# Patient Record
Sex: FEMALE | Race: WHITE | NOT HISPANIC OR LATINO | Employment: PART TIME | URBAN - METROPOLITAN AREA
[De-identification: names, ages, dates, MRNs, and addresses within clinical notes are randomized per-mention and may not be internally consistent; named-entity substitution may affect disease eponyms.]

---

## 2017-01-12 LAB
EXTERNAL ABO GROUPING: NORMAL
EXTERNAL RH FACTOR: POSITIVE
EXTERNAL RUBELLA QUALITATIVE: NORMAL
HIV1 AB SPEC QL IA.RAPID: NEGATIVE

## 2017-05-30 ENCOUNTER — TRANSCRIBE ORDERS (OUTPATIENT)
Dept: OBSTETRICS AND GYNECOLOGY | Facility: HOSPITAL | Age: 37
End: 2017-05-30

## 2017-05-30 DIAGNOSIS — O09.522 AMA (ADVANCED MATERNAL AGE) MULTIGRAVIDA 35+, SECOND TRIMESTER: Primary | ICD-10-CM

## 2017-06-20 ENCOUNTER — OFFICE VISIT (OUTPATIENT)
Dept: OBSTETRICS AND GYNECOLOGY | Facility: HOSPITAL | Age: 37
End: 2017-06-20

## 2017-06-20 ENCOUNTER — HOSPITAL ENCOUNTER (OUTPATIENT)
Dept: WOMENS IMAGING | Facility: HOSPITAL | Age: 37
Discharge: HOME | End: 2017-06-20
Attending: OBSTETRICS & GYNECOLOGY | Admitting: OBSTETRICS & GYNECOLOGY

## 2017-06-20 VITALS
HEIGHT: 65 IN | WEIGHT: 155.4 LBS | DIASTOLIC BLOOD PRESSURE: 67 MMHG | SYSTOLIC BLOOD PRESSURE: 113 MMHG | BODY MASS INDEX: 25.89 KG/M2

## 2017-06-20 DIAGNOSIS — O09.523 AMA (ADVANCED MATERNAL AGE) MULTIGRAVIDA 35+, THIRD TRIMESTER: Primary | ICD-10-CM

## 2017-06-20 DIAGNOSIS — O09.522 AMA (ADVANCED MATERNAL AGE) MULTIGRAVIDA 35+, SECOND TRIMESTER: ICD-10-CM

## 2017-06-20 PROCEDURE — 76811 OB US DETAILED SNGL FETUS: CPT

## 2017-06-20 PROCEDURE — 76811 OB US DETAILED SNGL FETUS: CPT | Performed by: OBSTETRICS & GYNECOLOGY

## 2017-06-20 RX ORDER — PRENATAL WITH FERROUS FUM AND FOLIC ACID 3080; 920; 120; 400; 22; 1.84; 3; 20; 10; 1; 12; 200; 27; 25; 2 [IU]/1; [IU]/1; MG/1; [IU]/1; MG/1; MG/1; MG/1; MG/1; MG/1; MG/1; UG/1; MG/1; MG/1; MG/1; MG/1
1 TABLET ORAL DAILY
COMMUNITY
End: 2020-12-17

## 2017-06-20 NOTE — PROGRESS NOTES
Documentation of the ultasound findings, images, and interpretations with be available in the patient's Viewpoint report located in the Chart Review Imaging tab in [place].

## 2017-06-20 NOTE — PROGRESS NOTES
Denies problems. Reports normal Panorama NIPT result. Prenatal care with Sarah Nava until 4 weeks ago; transferred to Dr. Stein because MsFidel Nava moved her practice to [facility].

## 2017-07-19 ENCOUNTER — LAB REQUISITION (OUTPATIENT)
Dept: LAB | Facility: HOSPITAL | Age: 37
End: 2017-07-19

## 2017-07-19 DIAGNOSIS — Z34.83 ENCOUNTER FOR SUPERVISION OF OTHER NORMAL PREGNANCY, THIRD TRIMESTER: ICD-10-CM

## 2017-07-19 LAB — EXTERNAL GROUP B STREP ANTIGEN: NORMAL

## 2017-07-19 PROCEDURE — 87081 CULTURE SCREEN ONLY: CPT | Performed by: OBSTETRICS & GYNECOLOGY

## 2017-07-22 LAB — BACTERIA SPEC AEROBE CULT: NORMAL

## 2017-08-07 ENCOUNTER — HOSPITAL ENCOUNTER (INPATIENT)
Dept: LABOR AND DELIVERY | Facility: HOSPITAL | Age: 37
LOS: 2 days | Discharge: HOME | End: 2017-08-10
Attending: OBSTETRICS & GYNECOLOGY | Admitting: OBSTETRICS & GYNECOLOGY

## 2017-08-07 DIAGNOSIS — Z3A.39 39 WEEKS GESTATION OF PREGNANCY: Primary | ICD-10-CM

## 2017-08-07 LAB
ABO GROUP BLD: NORMAL
BLD GP AB SCN SERPL QL: NEGATIVE
DEPRECATED RDW RBC AUTO: 43.6 FL (ref 37–54)
ERYTHROCYTE [DISTWIDTH] IN BLOOD BY AUTOMATED COUNT: 13.2 % (ref 11.3–14.5)
HCT VFR BLD AUTO: 39.1 % (ref 34.5–44)
HGB BLD-MCNC: 13.5 G/DL (ref 11.5–15.5)
MCH RBC QN AUTO: 31.2 PG (ref 27–31)
MCHC RBC AUTO-ENTMCNC: 34.5 G/DL (ref 32–36)
MCV RBC AUTO: 90.3 FL (ref 80–99)
PLATELET # BLD AUTO: 161 10*3/MM3 (ref 150–450)
PMV BLD AUTO: 11.3 FL (ref 6–12)
RBC # BLD AUTO: 4.33 10*6/MM3 (ref 3.89–5.14)
RH BLD: POSITIVE
WBC NRBC COR # BLD: 11.08 10*3/MM3 (ref 3.5–10.8)

## 2017-08-07 PROCEDURE — 59025 FETAL NON-STRESS TEST: CPT

## 2017-08-07 PROCEDURE — 86850 RBC ANTIBODY SCREEN: CPT | Performed by: OBSTETRICS & GYNECOLOGY

## 2017-08-07 PROCEDURE — 86900 BLOOD TYPING SEROLOGIC ABO: CPT | Performed by: OBSTETRICS & GYNECOLOGY

## 2017-08-07 PROCEDURE — 85027 COMPLETE CBC AUTOMATED: CPT | Performed by: OBSTETRICS & GYNECOLOGY

## 2017-08-07 PROCEDURE — 86901 BLOOD TYPING SEROLOGIC RH(D): CPT | Performed by: OBSTETRICS & GYNECOLOGY

## 2017-08-07 RX ORDER — SODIUM CHLORIDE, SODIUM LACTATE, POTASSIUM CHLORIDE, CALCIUM CHLORIDE 600; 310; 30; 20 MG/100ML; MG/100ML; MG/100ML; MG/100ML
125 INJECTION, SOLUTION INTRAVENOUS CONTINUOUS
Status: DISCONTINUED | OUTPATIENT
Start: 2017-08-07 | End: 2017-08-08

## 2017-08-07 RX ADMIN — SODIUM CHLORIDE, POTASSIUM CHLORIDE, SODIUM LACTATE AND CALCIUM CHLORIDE 125 ML/HR: 600; 310; 30; 20 INJECTION, SOLUTION INTRAVENOUS at 22:03

## 2017-08-08 ENCOUNTER — ANESTHESIA EVENT (OUTPATIENT)
Dept: LABOR AND DELIVERY | Facility: HOSPITAL | Age: 37
End: 2017-08-08

## 2017-08-08 ENCOUNTER — ANESTHESIA (OUTPATIENT)
Dept: LABOR AND DELIVERY | Facility: HOSPITAL | Age: 37
End: 2017-08-08

## 2017-08-08 ENCOUNTER — APPOINTMENT (OUTPATIENT)
Dept: LABOR AND DELIVERY | Facility: HOSPITAL | Age: 37
End: 2017-08-08

## 2017-08-08 PROBLEM — Z34.90 PREGNANCY: Status: ACTIVE | Noted: 2017-08-08

## 2017-08-08 LAB
AMPHET+METHAMPHET UR QL: NEGATIVE
AMPHETAMINES UR QL: NEGATIVE
BARBITURATES UR QL SCN: NEGATIVE
BENZODIAZ UR QL SCN: NEGATIVE
BUPRENORPHINE SERPL-MCNC: NEGATIVE NG/ML
CANNABINOIDS SERPL QL: NEGATIVE
COCAINE UR QL: NEGATIVE
METHADONE UR QL SCN: NEGATIVE
OPIATES UR QL: NEGATIVE
OXYCODONE UR QL SCN: NEGATIVE
PCP UR QL SCN: NEGATIVE
PROPOXYPH UR QL: NEGATIVE
TRICYCLICS UR QL SCN: NEGATIVE

## 2017-08-08 PROCEDURE — 25010000002 FENTANYL CITRATE (PF) 100 MCG/2ML SOLUTION: Performed by: NURSE ANESTHETIST, CERTIFIED REGISTERED

## 2017-08-08 PROCEDURE — 0HQ9XZZ REPAIR PERINEUM SKIN, EXTERNAL APPROACH: ICD-10-PCS | Performed by: OBSTETRICS & GYNECOLOGY

## 2017-08-08 PROCEDURE — C1755 CATHETER, INTRASPINAL: HCPCS | Performed by: ANESTHESIOLOGY

## 2017-08-08 PROCEDURE — C1755 CATHETER, INTRASPINAL: HCPCS

## 2017-08-08 PROCEDURE — 10907ZC DRAINAGE OF AMNIOTIC FLUID, THERAPEUTIC FROM PRODUCTS OF CONCEPTION, VIA NATURAL OR ARTIFICIAL OPENING: ICD-10-PCS | Performed by: OBSTETRICS & GYNECOLOGY

## 2017-08-08 PROCEDURE — 25010000002 ROPIVACAINE PER 1 MG: Performed by: NURSE ANESTHETIST, CERTIFIED REGISTERED

## 2017-08-08 PROCEDURE — 59025 FETAL NON-STRESS TEST: CPT

## 2017-08-08 PROCEDURE — 80306 DRUG TEST PRSMV INSTRMNT: CPT | Performed by: OBSTETRICS & GYNECOLOGY

## 2017-08-08 RX ORDER — PROMETHAZINE HYDROCHLORIDE 25 MG/ML
12.5 INJECTION, SOLUTION INTRAMUSCULAR; INTRAVENOUS EVERY 6 HOURS PRN
Status: DISCONTINUED | OUTPATIENT
Start: 2017-08-08 | End: 2017-08-08 | Stop reason: HOSPADM

## 2017-08-08 RX ORDER — MORPHINE SULFATE 4 MG/ML
2 INJECTION, SOLUTION INTRAMUSCULAR; INTRAVENOUS
Status: DISCONTINUED | OUTPATIENT
Start: 2017-08-08 | End: 2017-08-08 | Stop reason: HOSPADM

## 2017-08-08 RX ORDER — IBUPROFEN 600 MG/1
600 TABLET ORAL EVERY 6 HOURS PRN
Status: DISCONTINUED | OUTPATIENT
Start: 2017-08-08 | End: 2017-08-08 | Stop reason: HOSPADM

## 2017-08-08 RX ORDER — FENTANYL CITRATE 50 UG/ML
INJECTION, SOLUTION INTRAMUSCULAR; INTRAVENOUS AS NEEDED
Status: DISCONTINUED | OUTPATIENT
Start: 2017-08-08 | End: 2017-08-08 | Stop reason: SURG

## 2017-08-08 RX ORDER — PROMETHAZINE HYDROCHLORIDE 12.5 MG/1
12.5 TABLET ORAL EVERY 6 HOURS PRN
Status: DISCONTINUED | OUTPATIENT
Start: 2017-08-08 | End: 2017-08-08 | Stop reason: HOSPADM

## 2017-08-08 RX ORDER — SODIUM CHLORIDE 0.9 % (FLUSH) 0.9 %
1-10 SYRINGE (ML) INJECTION AS NEEDED
Status: DISCONTINUED | OUTPATIENT
Start: 2017-08-08 | End: 2017-08-08 | Stop reason: HOSPADM

## 2017-08-08 RX ORDER — DIPHENHYDRAMINE HCL 25 MG
25 CAPSULE ORAL NIGHTLY PRN
Status: DISCONTINUED | OUTPATIENT
Start: 2017-08-08 | End: 2017-08-08 | Stop reason: HOSPADM

## 2017-08-08 RX ORDER — METOCLOPRAMIDE HYDROCHLORIDE 5 MG/ML
10 INJECTION INTRAMUSCULAR; INTRAVENOUS ONCE AS NEEDED
Status: DISCONTINUED | OUTPATIENT
Start: 2017-08-08 | End: 2017-08-08 | Stop reason: HOSPADM

## 2017-08-08 RX ORDER — IBUPROFEN 600 MG/1
600 TABLET ORAL EVERY 6 HOURS PRN
Status: DISCONTINUED | OUTPATIENT
Start: 2017-08-08 | End: 2017-08-10 | Stop reason: HOSPADM

## 2017-08-08 RX ORDER — TRISODIUM CITRATE DIHYDRATE AND CITRIC ACID MONOHYDRATE 500; 334 MG/5ML; MG/5ML
30 SOLUTION ORAL ONCE
Status: DISCONTINUED | OUTPATIENT
Start: 2017-08-08 | End: 2017-08-08 | Stop reason: HOSPADM

## 2017-08-08 RX ORDER — DIPHENHYDRAMINE HYDROCHLORIDE 50 MG/ML
25 INJECTION INTRAMUSCULAR; INTRAVENOUS NIGHTLY PRN
Status: DISCONTINUED | OUTPATIENT
Start: 2017-08-08 | End: 2017-08-08 | Stop reason: HOSPADM

## 2017-08-08 RX ORDER — ZOLPIDEM TARTRATE 5 MG/1
5 TABLET ORAL NIGHTLY PRN
Status: DISCONTINUED | OUTPATIENT
Start: 2017-08-08 | End: 2017-08-10 | Stop reason: HOSPADM

## 2017-08-08 RX ORDER — OXYCODONE HYDROCHLORIDE AND ACETAMINOPHEN 5; 325 MG/1; MG/1
1 TABLET ORAL EVERY 4 HOURS PRN
Status: DISCONTINUED | OUTPATIENT
Start: 2017-08-08 | End: 2017-08-08 | Stop reason: HOSPADM

## 2017-08-08 RX ORDER — ONDANSETRON 2 MG/ML
4 INJECTION INTRAMUSCULAR; INTRAVENOUS ONCE AS NEEDED
Status: DISCONTINUED | OUTPATIENT
Start: 2017-08-08 | End: 2017-08-08 | Stop reason: HOSPADM

## 2017-08-08 RX ORDER — PROMETHAZINE HYDROCHLORIDE 12.5 MG/1
12.5 SUPPOSITORY RECTAL EVERY 6 HOURS PRN
Status: DISCONTINUED | OUTPATIENT
Start: 2017-08-08 | End: 2017-08-08 | Stop reason: HOSPADM

## 2017-08-08 RX ORDER — OXYTOCIN/RINGER'S LACTATE 30/500 ML
2-24 PLASTIC BAG, INJECTION (ML) INTRAVENOUS
Status: DISCONTINUED | OUTPATIENT
Start: 2017-08-08 | End: 2017-08-08 | Stop reason: HOSPADM

## 2017-08-08 RX ORDER — SODIUM CHLORIDE, SODIUM LACTATE, POTASSIUM CHLORIDE, CALCIUM CHLORIDE 600; 310; 30; 20 MG/100ML; MG/100ML; MG/100ML; MG/100ML
125 INJECTION, SOLUTION INTRAVENOUS CONTINUOUS
Status: DISCONTINUED | OUTPATIENT
Start: 2017-08-08 | End: 2017-08-08

## 2017-08-08 RX ORDER — LIDOCAINE HYDROCHLORIDE 10 MG/ML
5 INJECTION, SOLUTION INFILTRATION; PERINEURAL AS NEEDED
Status: DISCONTINUED | OUTPATIENT
Start: 2017-08-08 | End: 2017-08-08 | Stop reason: HOSPADM

## 2017-08-08 RX ORDER — ACETAMINOPHEN 325 MG/1
650 TABLET ORAL EVERY 4 HOURS PRN
Status: DISCONTINUED | OUTPATIENT
Start: 2017-08-08 | End: 2017-08-08 | Stop reason: HOSPADM

## 2017-08-08 RX ORDER — LANOLIN 100 %
OINTMENT (GRAM) TOPICAL AS NEEDED
Status: DISCONTINUED | OUTPATIENT
Start: 2017-08-08 | End: 2017-08-10 | Stop reason: HOSPADM

## 2017-08-08 RX ORDER — OXYCODONE HYDROCHLORIDE AND ACETAMINOPHEN 5; 325 MG/1; MG/1
2 TABLET ORAL EVERY 4 HOURS PRN
Status: DISCONTINUED | OUTPATIENT
Start: 2017-08-08 | End: 2017-08-08 | Stop reason: HOSPADM

## 2017-08-08 RX ORDER — LIDOCAINE HYDROCHLORIDE AND EPINEPHRINE 15; 5 MG/ML; UG/ML
INJECTION, SOLUTION EPIDURAL AS NEEDED
Status: DISCONTINUED | OUTPATIENT
Start: 2017-08-08 | End: 2017-08-08 | Stop reason: SURG

## 2017-08-08 RX ORDER — ZOLPIDEM TARTRATE 5 MG/1
5 TABLET ORAL NIGHTLY PRN
Status: DISCONTINUED | OUTPATIENT
Start: 2017-08-08 | End: 2017-08-08 | Stop reason: HOSPADM

## 2017-08-08 RX ORDER — HYDROCODONE BITARTRATE AND ACETAMINOPHEN 5; 325 MG/1; MG/1
1 TABLET ORAL EVERY 4 HOURS PRN
Status: DISCONTINUED | OUTPATIENT
Start: 2017-08-08 | End: 2017-08-10 | Stop reason: HOSPADM

## 2017-08-08 RX ORDER — EPHEDRINE SULFATE/0.9% NACL/PF 50 MG/10ML
10 SYRINGE (ML) INTRAVENOUS
Status: DISCONTINUED | OUTPATIENT
Start: 2017-08-08 | End: 2017-08-08 | Stop reason: HOSPADM

## 2017-08-08 RX ORDER — BISACODYL 10 MG
10 SUPPOSITORY, RECTAL RECTAL DAILY PRN
Status: DISCONTINUED | OUTPATIENT
Start: 2017-08-09 | End: 2017-08-10 | Stop reason: HOSPADM

## 2017-08-08 RX ORDER — PROMETHAZINE HYDROCHLORIDE 25 MG/1
25 TABLET ORAL EVERY 6 HOURS PRN
Status: DISCONTINUED | OUTPATIENT
Start: 2017-08-08 | End: 2017-08-10 | Stop reason: HOSPADM

## 2017-08-08 RX ORDER — FAMOTIDINE 10 MG/ML
20 INJECTION, SOLUTION INTRAVENOUS ONCE AS NEEDED
Status: DISCONTINUED | OUTPATIENT
Start: 2017-08-08 | End: 2017-08-08 | Stop reason: HOSPADM

## 2017-08-08 RX ORDER — DOCUSATE SODIUM 100 MG/1
100 CAPSULE, LIQUID FILLED ORAL 2 TIMES DAILY
Status: DISCONTINUED | OUTPATIENT
Start: 2017-08-08 | End: 2017-08-10 | Stop reason: HOSPADM

## 2017-08-08 RX ORDER — DIPHENHYDRAMINE HYDROCHLORIDE 50 MG/ML
12.5 INJECTION INTRAMUSCULAR; INTRAVENOUS EVERY 8 HOURS PRN
Status: DISCONTINUED | OUTPATIENT
Start: 2017-08-08 | End: 2017-08-08 | Stop reason: HOSPADM

## 2017-08-08 RX ADMIN — ROPIVACAINE HYDROCHLORIDE 15 ML/HR: 5 INJECTION, SOLUTION EPIDURAL; INFILTRATION; PERINEURAL at 09:47

## 2017-08-08 RX ADMIN — WITCH HAZEL 1 PAD: 500 SOLUTION RECTAL; TOPICAL at 16:37

## 2017-08-08 RX ADMIN — BENZOCAINE AND MENTHOL: 20; .5 SPRAY TOPICAL at 16:37

## 2017-08-08 RX ADMIN — FENTANYL CITRATE 100 MCG: 50 INJECTION, SOLUTION INTRAMUSCULAR; INTRAVENOUS at 09:42

## 2017-08-08 RX ADMIN — SODIUM CHLORIDE, POTASSIUM CHLORIDE, SODIUM LACTATE AND CALCIUM CHLORIDE 125 ML/HR: 600; 310; 30; 20 INJECTION, SOLUTION INTRAVENOUS at 09:47

## 2017-08-08 RX ADMIN — LIDOCAINE HYDROCHLORIDE AND EPINEPHRINE 3 ML: 15; 5 INJECTION, SOLUTION EPIDURAL at 09:38

## 2017-08-08 RX ADMIN — IBUPROFEN 600 MG: 600 TABLET, FILM COATED ORAL at 16:37

## 2017-08-08 RX ADMIN — IBUPROFEN 600 MG: 600 TABLET, FILM COATED ORAL at 22:26

## 2017-08-08 RX ADMIN — LIDOCAINE HYDROCHLORIDE AND EPINEPHRINE 2 ML: 15; 5 INJECTION, SOLUTION EPIDURAL at 09:41

## 2017-08-08 RX ADMIN — SODIUM CHLORIDE, POTASSIUM CHLORIDE, SODIUM LACTATE AND CALCIUM CHLORIDE 1000 ML: 600; 310; 30; 20 INJECTION, SOLUTION INTRAVENOUS at 09:03

## 2017-08-08 RX ADMIN — Medication 2 MILLI-UNITS/MIN: at 06:14

## 2017-08-08 RX ADMIN — ROPIVACAINE HYDROCHLORIDE 10 ML: 5 INJECTION, SOLUTION EPIDURAL; INFILTRATION; PERINEURAL at 09:45

## 2017-08-08 RX ADMIN — DOCUSATE SODIUM 100 MG: 100 CAPSULE, LIQUID FILLED ORAL at 20:50

## 2017-08-08 RX ADMIN — HYDROCORTISONE 1 APPLICATION: 25 CREAM TOPICAL at 16:37

## 2017-08-08 NOTE — PLAN OF CARE
Problem: Labor (Cervical Ripen, Induct, Augment) (Adult,Obstetrics,Pediatric)  Goal: Signs and Symptoms of Listed Potential Problems Will be Absent or Manageable (Labor)  Outcome: Outcome(s) achieved Date Met:  08/08/17

## 2017-08-08 NOTE — H&P
History and Physical   [facility]    Chief Complaint   Patient presents with   • Scheduled Induction     elective        Patient Active Problem List   Diagnosis   •  (spontaneous vaginal delivery)       Kimberly Booth is a 37 y.o. year old  with an Estimated Date of Delivery: 8/15/17 currently at 39w0d presenting with for induction of labor for elective at term per patient request.  She currently reports no complaints    Prenatal care has been with Dr. Culp.  It has been significant for uncomplicated    Risks of labor induction including prolongation of labor, increased risks for both  section and operative vaginal birth have been discussed at length.       The following portions of the patient's history were reviewed and updated as appropriate:vital signs, allergies, current medications, past medical history, past social history, past surgical history and problem list.    PAST MEDICAL HISTORY  History reviewed. No pertinent past medical history.    PAST SURGICAL HISTORY  Past Surgical History:   Procedure Laterality Date   • TONSILLECTOMY         ALLERGIES  Review of patient's allergies indicates no known allergies.    MEDICATIONS    Current Facility-Administered Medications:   •  acetaminophen (TYLENOL) tablet 650 mg, 650 mg, Oral, Q4H PRN, Hayden Bojorquez MD  •  acetaminophen (TYLENOL) tablet 650 mg, 650 mg, Oral, Q4H PRN, Hayden Bojorquez MD  •  butorphanol (STADOL) injection 1 mg, 1 mg, Intravenous, Q2H PRN, Hayden Bojorquez MD  •  butorphanol (STADOL) injection 2 mg, 2 mg, Intravenous, Q2H PRN, Hayden Bojorquez MD  •  diphenhydrAMINE (BENADRYL) capsule 25 mg, 25 mg, Oral, Nightly PRN **OR** diphenhydrAMINE (BENADRYL) injection 25 mg, 25 mg, Intravenous, Nightly PRN, Hayden Bojorquez MD  •  diphenhydrAMINE (BENADRYL) capsule 25 mg, 25 mg, Oral, Nightly PRN **OR** diphenhydrAMINE (BENADRYL) injection 25 mg, 25 mg, Intravenous, Nightly PRN, Hayden Bojorquez MD  •  diphenhydrAMINE  (BENADRYL) injection 12.5 mg, 12.5 mg, Intravenous, Q8H PRN, Leti Matos CRNA  •  ePHEDrine syringe 10 mg, 10 mg, Intravenous, Q10 Min PRN, Leti Matos CRNA  •  famotidine (PEPCID) injection 20 mg, 20 mg, Intravenous, Once PRN, Leti Matos CRNA  •  ibuprofen (ADVIL,MOTRIN) tablet 600 mg, 600 mg, Oral, Q6H PRN, Hayden Bojorquez MD  •  lactated ringers bolus 1,000 mL, 1,000 mL, Intravenous, Once, Leti Matos CRNA  •  lactated ringers infusion, 125 mL/hr, Intravenous, Continuous, Hayden Bojorquez MD, Last Rate: 125 mL/hr at 08/08/17 0947, 125 mL/hr at 08/08/17 0947  •  lidocaine (XYLOCAINE) 1 % injection 5 mL, 5 mL, Intradermal, PRN, Hayden Bojorquez MD  •  metoclopramide (REGLAN) injection 10 mg, 10 mg, Intravenous, Once PRN, Leti Matos CRNA  •  Morphine sulfate (PF) injection 2 mg, 2 mg, Intravenous, Q2H PRN, Hayden Bojorquez MD  •  Morphine sulfate (PF) injection 2 mg, 2 mg, Intravenous, Q2H PRN, Hayden Bojorquez MD  •  ondansetron (ZOFRAN) injection 4 mg, 4 mg, Intravenous, Once PRN, Leti Matos CRNA  •  oxyCODONE-acetaminophen (PERCOCET) 5-325 MG per tablet 1 tablet, 1 tablet, Oral, Q4H PRN, Hayden Bojorquez MD  •  oxyCODONE-acetaminophen (PERCOCET) 5-325 MG per tablet 2 tablet, 2 tablet, Oral, Q4H PRN, Hayden Bojorquez MD  •  oxytocin in lactated ringers 30 UNIT/500ML infusion, 2-24 liz-units/min, Intravenous, Titrated, Hayden Bojorquez MD, Last Rate: 10 mL/hr at 08/08/17 1042, 10 liz-units/min at 08/08/17 1042  •  promethazine (PHENERGAN) injection 12.5 mg, 12.5 mg, Intravenous, Q6H PRN **OR** promethazine (PHENERGAN) injection 12.5 mg, 12.5 mg, Intramuscular, Q6H PRN **OR** promethazine (PHENERGAN) suppository 12.5 mg, 12.5 mg, Rectal, Q6H PRN **OR** promethazine (PHENERGAN) tablet 12.5 mg, 12.5 mg, Oral, Q6H PRN, Hayden Bojorquez MD  •  promethazine (PHENERGAN) injection 12.5 mg, 12.5 mg, Intravenous, Q6H PRN  "**OR** promethazine (PHENERGAN) injection 12.5 mg, 12.5 mg, Intramuscular, Q6H PRN **OR** promethazine (PHENERGAN) suppository 12.5 mg, 12.5 mg, Rectal, Q6H PRN **OR** promethazine (PHENERGAN) tablet 12.5 mg, 12.5 mg, Oral, Q6H PRN, Hayden Bojorquez MD  •  ropivacaine 0.15% in 170 mL normal saline epidural, 15 mL/hr, Epidural, Continuous, Leti Matos CRNA, Last Rate: 15 mL/hr at 08/08/17 0947, 15 mL/hr at 08/08/17 0947  •  Sod Citrate-Citric Acid (BICITRA) solution 30 mL, 30 mL, Oral, Once, Leti Matos CRNA  •  sodium chloride 0.9 % flush 1-10 mL, 1-10 mL, Intravenous, PRN, Hayden Bojorquez MD  •  zolpidem (AMBIEN) tablet 5 mg, 5 mg, Oral, Nightly PRN, Hayden Bojorquez MD  •  zolpidem (AMBIEN) tablet 5 mg, 5 mg, Oral, Nightly PRN, Hayden Bojorquez MD      Objective     Blood pressure 120/56, pulse 61, temperature 98.2 °F (36.8 °C), temperature source Oral, resp. rate 18, height 64\" (162.6 cm), weight 160 lb (72.6 kg), last menstrual period 11/08/2016, currently breastfeeding.    Physical Exam    General:  well developed; well nourished  no acute distress           Abdomen: soft, non-tender; no masses       FHT's: reactive and category 1   Cervix: 1/40   Moore: Contraction are irregular     Lab Review   Labs: No data reviewed   Lab Results (last 24 hours)     Procedure Component Value Units Date/Time    CBC (No Diff) [[id number]]  (Abnormal) Collected:  08/07/17 2203    Specimen:  Blood Updated:  08/07/17 2224     WBC 11.08 (H) 10*3/mm3      RBC 4.33 10*6/mm3      Hemoglobin 13.5 g/dL      Hematocrit 39.1 %      MCV 90.3 fL      MCH 31.2 (H) pg      MCHC 34.5 g/dL      RDW 13.2 %      RDW-SD 43.6 fl      MPV 11.3 fL      Platelets 161 10*3/mm3     HIV-1 Antibody, EIA [[id number]] Resulted:  01/12/17     Specimen:  Blood Updated:  08/08/17 0051     External HIV-1 Antibody Negative    Rubella Antibody, IgG [[id number]] Resulted:  01/12/17     Specimen:  Blood Updated:  08/08/17 0051     " External Rubella Qual Immune    Group B Streptococcus Culture [[id number]] Resulted:  07/19/17     Specimen:  Swab from Vaginal/Rectum Updated:  08/08/17 0052     External Strep Group B Ag NEG          Imaging   No data reviewed   Imaging Results (last 24 hours)     ** No results found for the last 24 hours. **          Assessment/Plan     ASSESSMENT  1. IUP at 39w0d  2. Induction of labor indication: h/o preciptious labor  3. Group B strep status: negative                PLAN  1. Admit for andre bulb and pitocin induction         Kimberly Stein MD  8/8/20172:11 PM

## 2017-08-08 NOTE — ANESTHESIA PREPROCEDURE EVALUATION
Anesthesia Evaluation     Patient summary reviewed and Nursing notes reviewed          Airway   Mallampati: I  TM distance: >3 FB  Neck ROM: full  Dental      Pulmonary - negative pulmonary ROS   Cardiovascular - negative cardio ROS        Neuro/Psych- negative ROS  GI/Hepatic/Renal/Endo - negative ROS     Musculoskeletal (-) negative ROS    Abdominal    Substance History - negative use     OB/GYN    (+) Pregnant,         Other - negative ROS                                       Anesthesia Plan    ASA 2     epidural     Anesthetic plan and risks discussed with patient.

## 2017-08-08 NOTE — ANESTHESIA PROCEDURE NOTES
Labor Epidural    Patient location during procedure: floor  Performed By  Anesthesiologist: ARIEL REYES  CRNA: SERVANDO TERRELL  Preanesthetic Checklist  Completed: patient identified, surgical consent, pre-op evaluation, timeout performed, IV checked, risks and benefits discussed and monitors and equipment checked  Prep:  Pt Position:sitting  Sterile Tech:cap, gloves, mask and sterile barrier  Prep:chlorhexidine gluconate and isopropyl alcohol  Monitoring:blood pressure monitoring  Epidural Block Procedure:  Approach:midline  Guidance:palpation technique  Location:L3-L4  Needle Type:Tuohy  Needle Gauge:17 G  Loss of Resistance Medium: air  Loss of Resistance: 4cm  Cath Depth at skin:10 cm  Paresthesia: none  Aspiration:negative  Test Dose:negative  Number of Attempts: 1  Post Assessment:  Dressing:occlusive dressing applied and secured with tape  Pt Tolerance:patient tolerated the procedure well with no apparent complications  Complications:no

## 2017-08-08 NOTE — L&D DELIVERY NOTE
2017    Patient:Kimberly Booth    MR#:[id number]    Vaginal Delivery Note  37 y.o. yo female  at 39w0d    Patient Active Problem List   Diagnosis   • Pregnancy       Delivery     Delivery: Vaginal, Spontaneous Delivery     YOB: 2016    Time of Birth: 1:07 PM      Anesthesia: Epidural     Delivering clinician: Kimberly Stein    Forceps?   No   Vacuum? No    Shoulder dystocia present: No          Infant    Findings: male  infant     Infant observations: Weight: 7 lb 7.1 oz (3.375 kg)     Observations/Comments:         Apgars: 8   @ 1 minute /    9   @ 5 minutes         Placenta, Cord, and Fluid    Amnionic Fluid: clear   Placenta delivered  Spontaneous  at    1:10 PM     Cord: 3 vessels  present.  Uterus explored and empty   Nuchal Cord?  no   Cord blood obtained: Yes    Cord gases obtained:  No              Repair    Episiotomy: No   Lacerations: Yes  Laceration Information  Laceration Repaired?   Perineal: 1st  Yes    Periurethral:         Labial:      right labial   Sulcus:         Vaginal: No       Cervical: No             Estimated Blood Loss:    400 mls.   Suture used for repair: 3-0 Vicryl      Complications  none    Disposition  Mother to Mother Baby/Postpartum  in stable condition currently.  Baby to remains with mom  in stable condition currently.                Kimberly Stein MD  17  1:59 PM

## 2017-08-08 NOTE — PROCEDURES
37 y.o.  OB History      Para Term  AB TAB SAB Ectopic Multiple Living    3 2 2 0 0 0 0 0 0 2       Presents as an induction of labour.  Her primary OB requests a Domínguez Bulb placement to initiate the induction of labour.    Fetal Heart Rate Assessment   Method:     Beats/min:  120s   Baseline:  120s   Varibility:  moderate   Accels:  yes   Decels:  no   Tracing Category:  1     TOCO:  irregular  SVE: 1/80/-2    A Domínguez Bulb was placed without difficulties with 60 cc of sterile saline.  The patient tolerated the procedure well.

## 2017-08-09 LAB
BASOPHILS # BLD AUTO: 0.02 10*3/MM3 (ref 0–0.2)
BASOPHILS NFR BLD AUTO: 0.2 % (ref 0–1)
DEPRECATED RDW RBC AUTO: 44 FL (ref 37–54)
EOSINOPHIL # BLD AUTO: 0.16 10*3/MM3 (ref 0–0.3)
EOSINOPHIL NFR BLD AUTO: 1.3 % (ref 0–3)
ERYTHROCYTE [DISTWIDTH] IN BLOOD BY AUTOMATED COUNT: 13.3 % (ref 11.3–14.5)
HCT VFR BLD AUTO: 37.9 % (ref 34.5–44)
HGB BLD-MCNC: 12.6 G/DL (ref 11.5–15.5)
IMM GRANULOCYTES # BLD: 0.04 10*3/MM3 (ref 0–0.03)
IMM GRANULOCYTES NFR BLD: 0.3 % (ref 0–0.6)
LYMPHOCYTES # BLD AUTO: 1.69 10*3/MM3 (ref 0.6–4.8)
LYMPHOCYTES NFR BLD AUTO: 13.4 % (ref 24–44)
MCH RBC QN AUTO: 30.3 PG (ref 27–31)
MCHC RBC AUTO-ENTMCNC: 33.2 G/DL (ref 32–36)
MCV RBC AUTO: 91.1 FL (ref 80–99)
MONOCYTES # BLD AUTO: 0.91 10*3/MM3 (ref 0–1)
MONOCYTES NFR BLD AUTO: 7.2 % (ref 0–12)
NEUTROPHILS # BLD AUTO: 9.77 10*3/MM3 (ref 1.5–8.3)
NEUTROPHILS NFR BLD AUTO: 77.6 % (ref 41–71)
PLATELET # BLD AUTO: 153 10*3/MM3 (ref 150–450)
PMV BLD AUTO: 11.1 FL (ref 6–12)
RBC # BLD AUTO: 4.16 10*6/MM3 (ref 3.89–5.14)
WBC NRBC COR # BLD: 12.59 10*3/MM3 (ref 3.5–10.8)

## 2017-08-09 PROCEDURE — 85025 COMPLETE CBC W/AUTO DIFF WBC: CPT | Performed by: OBSTETRICS & GYNECOLOGY

## 2017-08-09 RX ADMIN — IBUPROFEN 600 MG: 600 TABLET, FILM COATED ORAL at 04:53

## 2017-08-09 RX ADMIN — IBUPROFEN 600 MG: 600 TABLET, FILM COATED ORAL at 17:07

## 2017-08-09 RX ADMIN — IBUPROFEN 600 MG: 600 TABLET, FILM COATED ORAL at 23:04

## 2017-08-09 RX ADMIN — DOCUSATE SODIUM 100 MG: 100 CAPSULE, LIQUID FILLED ORAL at 15:25

## 2017-08-09 RX ADMIN — IBUPROFEN 600 MG: 600 TABLET, FILM COATED ORAL at 11:08

## 2017-08-09 RX ADMIN — DOCUSATE SODIUM 100 MG: 100 CAPSULE, LIQUID FILLED ORAL at 23:04

## 2017-08-09 NOTE — PROGRESS NOTES
8/9/2017  PPD #1    Subjective   Kimberly feels well.  Patient describes her lochia less than menses.  Pain is well controlled       Objective   Temp: Temp:  [98.1 °F (36.7 °C)-98.6 °F (37 °C)] 98.6 °F (37 °C) Temp src: Oral   BP: BP: ()/(51-76) 116/76        Pulse: Heart Rate:  [] 62  RR: Resp:  [14-18] 14    General:  No acute distress   Abdomen: Fundus firm and beneath umbilicus   Pelvis: deferred     Lab Results   Component Value Date    WBC 12.59 (H) 08/09/2017    HGB 12.6 08/09/2017    HCT 37.9 08/09/2017    MCV 91.1 08/09/2017     08/09/2017       Assessment  1. PPD# 1 after vaginal delivery    Plan  1. Routine postpartum care.      This note has been electronically signed.    Denise Michelle, APRN  August 9, 2017

## 2017-08-09 NOTE — PLAN OF CARE
Problem: Breastfeeding (Adult,NICU,[place],Obstetrics,Pediatric)  Goal: Signs and Symptoms of Listed Potential Problems Will be Absent or Manageable (Breastfeeding)  Outcome: Ongoing (interventions implemented as appropriate)

## 2017-08-09 NOTE — ANESTHESIA POSTPROCEDURE EVALUATION
Patient: Kimberly Booth    Procedure Summary     Date Anesthesia Start Anesthesia Stop Room / Location    08/08/17 0925 1315        Procedure Diagnosis Scheduled Providers Provider    LABOR ANALGESIA No diagnosis on file.  Cesar Albarado MD          Anesthesia Type: epidural  Last vitals  BP        Temp        Pulse       Resp        SpO2          Post Anesthesia Care and Evaluation    Patient location during evaluation: bedside  Patient participation: complete - patient participated  Level of consciousness: awake and alert  Pain management: adequate  Airway patency: patent  Anesthetic complications: No anesthetic complications    Cardiovascular status: acceptable  Respiratory status: acceptable  Hydration status: acceptable  Post Neuraxial Block status: Motor and sensory function returned to baseline and No signs or symptoms of PDPH

## 2017-08-10 VITALS
RESPIRATION RATE: 16 BRPM | SYSTOLIC BLOOD PRESSURE: 100 MMHG | DIASTOLIC BLOOD PRESSURE: 61 MMHG | HEIGHT: 64 IN | BODY MASS INDEX: 27.31 KG/M2 | TEMPERATURE: 98.6 F | WEIGHT: 160 LBS | HEART RATE: 86 BPM

## 2017-08-10 RX ORDER — IBUPROFEN 600 MG/1
600 TABLET ORAL EVERY 6 HOURS PRN
Qty: 30 TABLET | Refills: 0 | Status: SHIPPED | OUTPATIENT
Start: 2017-08-10 | End: 2021-02-24

## 2017-08-10 RX ADMIN — DOCUSATE SODIUM 100 MG: 100 CAPSULE, LIQUID FILLED ORAL at 09:21

## 2017-08-10 RX ADMIN — IBUPROFEN 600 MG: 600 TABLET, FILM COATED ORAL at 05:51

## 2017-08-10 NOTE — PLAN OF CARE
Problem: Patient Care Overview (Adult)  Goal: Plan of Care Review  Outcome: Outcome(s) achieved Date Met:  08/10/17    08/10/17 1000   Coping/Psychosocial Response Interventions   Plan Of Care Reviewed With patient;spouse   Patient Care Overview   Progress improving       Goal: Adult Individualization and Mutuality  Outcome: Outcome(s) achieved Date Met:  08/10/17  Goal: Discharge Needs Assessment  Outcome: Outcome(s) achieved Date Met:  08/10/17    Problem: Postpartum, Vaginal Delivery (Adult)  Goal: Signs and Symptoms of Listed Potential Problems Will be Absent or Manageable (Postpartum, Vaginal Delivery)  Outcome: Outcome(s) achieved Date Met:  08/10/17    Problem: Breastfeeding (Adult,NICU,,Obstetrics,Pediatric)  Goal: Signs and Symptoms of Listed Potential Problems Will be Absent or Manageable (Breastfeeding)  Outcome: Outcome(s) achieved Date Met:  08/10/17

## 2017-08-10 NOTE — PLAN OF CARE
Problem: Patient Care Overview (Adult)  Goal: Plan of Care Review  Outcome: Ongoing (interventions implemented as appropriate)    08/10/17 0504   Coping/Psychosocial Response Interventions   Plan Of Care Reviewed With patient;spouse   Patient Care Overview   Progress improving   Outcome Evaluation   Outcome Summary/Follow up Plan VSS, fundus and lochia WDL, pain controlled PO meds, progressing well.       Goal: Adult Individualization and Mutuality  Outcome: Ongoing (interventions implemented as appropriate)  Goal: Discharge Needs Assessment  Outcome: Ongoing (interventions implemented as appropriate)    Problem: Postpartum, Vaginal Delivery (Adult)  Goal: Signs and Symptoms of Listed Potential Problems Will be Absent or Manageable (Postpartum, Vaginal Delivery)  Outcome: Ongoing (interventions implemented as appropriate)

## 2017-08-10 NOTE — DISCHARGE SUMMARY
Discharge Summary    Date of Admission: 2017  Date of Discharge:  8/10/2017      Patient: Kimberly Booth      MR#:[id number]    Delivery Provider: Kimberly Stein       Presenting Problem/History of Present Illness  Pregnancy [Z33.1]     Patient Active Problem List   Diagnosis   (none) - all problems resolved or deleted         Discharge Diagnosis: Vaginal delivery at 39w0d    Procedures:  Vaginal, Spontaneous Delivery     2017    1:07 PM        Discharge Date: 8/10/2017;     Hospital Course  Patient is a 37 y.o. female  at 39w0d status post vaginal delivery without complication. Postpartum the patient did well. She remained afebrile, with vital signs stable. She was ready for discharge on postpartum day 2.     Infant:   male  fetus 7 lb 7.1 oz (3.375 kg)  with Apgar scores of 8  , 9   at five minutes.    Condition on Discharge:  Stable    Vital Signs  Temp:  [97.5 °F (36.4 °C)-98.6 °F (37 °C)] 98.6 °F (37 °C)  Heart Rate:  [61-86] 86  Resp:  [16] 16  BP: ()/(48-61) 100/61    Lab Results   Component Value Date    WBC 12.59 (H) 2017    HGB 12.6 2017    HCT 37.9 2017    MCV 91.1 2017     2017       Discharge Disposition  Home or Self Care    Discharge Medications   Kimberly Booth   Home Medication Instructions [id number]    Printed on:08/10/17 0844   Medication Information                      ibuprofen (ADVIL,MOTRIN) 600 MG tablet  Take 1 tablet by mouth Every 6 (Six) Hours As Needed for Mild Pain (1-3).             Prenatal Vit-Fe Fumarate-FA (PRENATAL 27-1) 27-1 MG tablet tablet  Take 1 tablet by mouth Daily.                 Discharge Diet:     Activity at Discharge:   Activity Instructions     Pelvic Rest                     Follow-up Appointments  No future appointments.  Additional Instructions for the Follow-ups that You Need to Schedule     Call MD for problems / concerns.    As directed        Discharge Follow-up with Specified Provider    As  directed    To:  dr adame   Follow Up:  6 Weeks       Follow-Up    As directed    Follow Up Details:  6 weeks with deep Rowell, NALLELY  08/10/17  8:44 AM  Csd

## 2020-09-14 ENCOUNTER — OFFICE VISIT (OUTPATIENT)
Dept: OBSTETRICS AND GYNECOLOGY | Facility: CLINIC | Age: 40
End: 2020-09-14

## 2020-09-14 VITALS
SYSTOLIC BLOOD PRESSURE: 128 MMHG | HEIGHT: 63 IN | DIASTOLIC BLOOD PRESSURE: 70 MMHG | WEIGHT: 127.3 LBS | BODY MASS INDEX: 22.55 KG/M2

## 2020-09-14 DIAGNOSIS — N60.12 FIBROCYSTIC BREAST CHANGES OF BOTH BREASTS: Primary | ICD-10-CM

## 2020-09-14 DIAGNOSIS — N60.11 FIBROCYSTIC BREAST CHANGES OF BOTH BREASTS: Primary | ICD-10-CM

## 2020-09-14 PROCEDURE — 99213 OFFICE O/P EST LOW 20 MIN: CPT | Performed by: NURSE PRACTITIONER

## 2020-09-14 RX ORDER — AZELASTINE HYDROCHLORIDE, FLUTICASONE PROPIONATE 137; 50 UG/1; UG/1
SPRAY, METERED NASAL
COMMUNITY
End: 2023-01-24

## 2020-09-14 NOTE — PROGRESS NOTES
Chief Complaint   Patient presents with   • Left breast lump       Subjective   HPI  Kimberly Booth is a 40 y.o. female, , who presents for left breast lump.  Reports tenderness, denies nipple discharge.      She states she has experienced this problem for 1 month.  She describes the severity as mild.  She states that the problem is stable.  The patient reports additional symptoms as none.      Her last LMP was Patient's last menstrual period was 2020..  Periods are regular every 28-30 days, lasting 5-7 days.  Dysmenorrhea:mild, occurring first 1-2 days of flow.  Patient reports problems with: bloating, breast tenderness and weight gain.  Partner Status: Marital Status: .  New Partners since last visit: no.  Desires STD Screening: no.    Additional OB/GYN History   Current contraception: contraceptive methods: Vasectomy   Desires to: do not start contraception  Last Pap : 10/2019  Last Completed Pap Smear       Status Date      PAP SMEAR No completions recorded        History of abnormal Pap smear: no  Last mammogram: never had   Last Completed Mammogram     Patient has no health maintenance due at this time        Tobacco Usage?: No   OB History        3    Para   3    Term   3       0    AB   0    Living   3       SAB   0    TAB   0    Ectopic   0    Molar        Multiple   0    Live Births   3                Health Maintenance   Topic Date Due   • Annual Gynecologic Pelvic and Breast Exam  1980   • ANNUAL PHYSICAL  07/15/1984   • TDAP/TD VACCINES (1 - Tdap) 07/15/2000   • HEPATITIS C SCREENING  2017   • PAP SMEAR  2017   • INFLUENZA VACCINE  2020   •  AMB Pneumococcal Vaccine 65+ (1 of 1 - PPSV23) 07/15/2046   •  AMB Pneumococcal Vaccine 0-64  Aged Out       The additional following portions of the patient's history were reviewed and updated as appropriate: allergies, current medications, past family history, past medical history, past social history,  "past surgical history and problem list.    Review of Systems   Genitourinary: Positive for breast lump and breast pain ( LEFT BREAST PAIN, MILD). Dysuria:  LEFT BREAST LUMP.       I have reviewed and agree with the HPI, ROS, and historical information as entered above. Denise Michelle, NALLELY    Objective   /70   Ht 160 cm (63\")   Wt 57.7 kg (127 lb 4.8 oz)   LMP 09/13/2020   BMI 22.55 kg/m²     Physical Exam  Chest:      Breasts:         Left: Mass ( APPROX 1 CM LEFT BREAST LUMPS AT 12 O'CLOCK AND 3 O'CLOCK POSITIONS APPROX 2\" FROM AREOLA (FIBROCYSTIC BREASTS)) and tenderness ( MILD LEFT BREAST) present.         Assessment/Plan     Assessment     Problem List Items Addressed This Visit     None      Visit Diagnoses     Fibrocystic breast changes of both breasts    -  Primary          1. Fibrocystic breast changes  2. Breast lumps left breast (SEE PE)      Plan     Patient will monitor breasts for now and decrease caffeine intake. She has annual scheduled in November and will have f/u breast exam then. If lumps increase in size, have any breast skin changes, or pain increases, will come back for breast exam before November annual exam appointment.      NALLELY Lai  09/14/2020  "

## 2020-12-17 ENCOUNTER — OFFICE VISIT (OUTPATIENT)
Dept: OBSTETRICS AND GYNECOLOGY | Facility: CLINIC | Age: 40
End: 2020-12-17

## 2020-12-17 VITALS
WEIGHT: 124 LBS | DIASTOLIC BLOOD PRESSURE: 78 MMHG | BODY MASS INDEX: 21.97 KG/M2 | HEIGHT: 63 IN | SYSTOLIC BLOOD PRESSURE: 122 MMHG

## 2020-12-17 DIAGNOSIS — N63.0 BREAST NODULE: ICD-10-CM

## 2020-12-17 DIAGNOSIS — Z12.39 ENCOUNTER FOR BREAST CANCER SCREENING USING NON-MAMMOGRAM MODALITY: ICD-10-CM

## 2020-12-17 DIAGNOSIS — F41.1 GAD (GENERALIZED ANXIETY DISORDER): ICD-10-CM

## 2020-12-17 DIAGNOSIS — R14.0 BLOATING: ICD-10-CM

## 2020-12-17 DIAGNOSIS — Z23 NEED FOR HPV VACCINATION: ICD-10-CM

## 2020-12-17 DIAGNOSIS — Z01.419 WOMEN'S ANNUAL ROUTINE GYNECOLOGICAL EXAMINATION: Primary | ICD-10-CM

## 2020-12-17 PROCEDURE — 99213 OFFICE O/P EST LOW 20 MIN: CPT | Performed by: OBSTETRICS & GYNECOLOGY

## 2020-12-17 PROCEDURE — 90651 9VHPV VACCINE 2/3 DOSE IM: CPT | Performed by: OBSTETRICS & GYNECOLOGY

## 2020-12-17 PROCEDURE — 99395 PREV VISIT EST AGE 18-39: CPT | Performed by: OBSTETRICS & GYNECOLOGY

## 2020-12-17 PROCEDURE — 90471 IMMUNIZATION ADMIN: CPT | Performed by: OBSTETRICS & GYNECOLOGY

## 2020-12-17 RX ORDER — ESCITALOPRAM OXALATE 10 MG/1
10 TABLET ORAL DAILY
Qty: 30 TABLET | Refills: 2 | Status: SHIPPED | OUTPATIENT
Start: 2020-12-17 | End: 2022-01-18

## 2020-12-17 NOTE — PROGRESS NOTES
First HPV Injection Note  Kimberly Booth is a 40 y.o. female here for counseling on HPV vaccination. Her LMP was 12/4/2020. She weighs 124 lbs and she is 5 feet 3 inches tall. Kimberly Booth is sexually active. The patient's last resulted pap smear was 10/16/2019. The results were normal.    She has received and reviewed the Gardasil Information Sheet. Contraindications have been reviewed.    Patient does not have cancer, leukemia, AIDS, or any other immune system problems. She has not received a blood transfusion, blood products, or immune gamma globulin in the last 6 months. She does not take cortisone, prednisone, steroid drugs, anticancer drugs, or had recent x-ray treatments. She has not received any vaccinations in the last 4 weeks. She has been counseled not to get pregnant in the next 6 months. She is using her 's vasectomy for birth control.      Patient Questions  Have you been sick in the last 36 hours? No    Have you ever passed out from an injection? No     Have you ever had a reaction to a previous vaccination? No    Are you allergic to any medications? No    Are you allergic to any foods? Yes - nuts    Are you allergic to any vaccines? No    Have you been sick in the last month? No    Information Reviewed  Emerita Wang RN reviewed the possible side effects of pain, swelling, itching at the injection site, fever, difficulty breathing and raely anaphylactic reactions. The visit involved counseling on STD prevention and vaccine benefits. Verbalized understanding.    Injection Details  Lot #: [id number]  NDC #: 3539-8914-67  Expiration Date: 11/15/2022  Injection site: left deltoid  (also noted in MAR)    Remained at patient's side for 15 minutes, post-injection, to ensure allergic reaction or fainting did not occur. Patient with steady gait and ambulation at discharge from the clinic.    Future Injections  Her next injection is in 2 months: 02/17/2021. The date of the third injection will be in  6 months: 06/17/2021    Toleration  Patient tolerated the injection well with no problems.    Electronically Signed By:  Emerita Wang RN  12/17/2020

## 2020-12-17 NOTE — PROGRESS NOTES
GYN Annual Exam     CC - Here for annual exam.     Subjective   HPI  Kimberly Booth is a 40 y.o. female, , who presents for annual well woman exam. Patient's last menstrual period was 2020..  Periods are regular every 25-35 days, lasting 7 days. The patient uses 1 of tampons/pads per hour., lasting 7 days.  Dysmenorrhea:mild, occurring first 1-2 days of flow.  Patient reports problems with: anxiety, bloating and breast tenderness with periods that is mild and has not changed..  Partner Status: Marital Status: marriedNew Partners since last visit: no.  Desires STD Screening: no.  Patient has not had the HPV vaccine.     Additional OB/GYN History     Current contraception: contraceptive methods: Vasectomy   Desires to: do not start contraception  Last Pap : 10/16/2019 - negative  Last Completed Pap Smear       Status Date      PAP SMEAR No completions recorded        History of abnormal Pap smear: no  Family history of uterine, colon, breast, or ovarian cancer: no  Previous Mammogram :  no  Performs monthly Self-Breast Exam: yes  Exercises Regularly: walks daily   Feelings of Anxiety or Depression: yes - anxiety  Tobacco Usage?: No   OB History        3    Para   3    Term   3       0    AB   0    Living   3       SAB   0    TAB   0    Ectopic   0    Molar        Multiple   0    Live Births   3                Health Maintenance   Topic Date Due   • Annual Gynecologic Pelvic and Breast Exam  1981   • ANNUAL PHYSICAL  07/15/1984   • TDAP/TD VACCINES (1 - Tdap) 07/15/2000   • HEPATITIS C SCREENING  2017   • PAP SMEAR  2017   • INFLUENZA VACCINE  2020   • Pneumococcal Vaccine 0-64  Aged Out           The additional following portions of the patient's history were reviewed and updated as appropriate: allergies, current medications, past family history, past medical history, past social history, past surgical history and problem list.    Review of Systems   Constitutional:  "Negative.    HENT: Negative.    Respiratory: Negative.    Cardiovascular: Negative.    Genitourinary: Negative.    Musculoskeletal: Negative.    Skin: Negative.    Allergic/Immunologic: Negative.    Neurological: Negative.    Hematological: Negative.    Psychiatric/Behavioral: Negative.  Positive for stress.       I have reviewed and agree with the HPI, ROS, and historical information as entered above. Kimberly Stein MD    Objective   /78   Ht 160 cm (63\")   Wt 56.2 kg (124 lb)   LMP 12/04/2020   BMI 21.97 kg/m²     Physical Exam  Vitals signs and nursing note reviewed. Exam conducted with a chaperone present.   Constitutional:       Appearance: She is well-developed.   HENT:      Head: Normocephalic and atraumatic.   Neck:      Musculoskeletal: Normal range of motion. No muscular tenderness.      Thyroid: No thyroid mass or thyromegaly.   Cardiovascular:      Rate and Rhythm: Normal rate and regular rhythm.      Heart sounds: No murmur.   Pulmonary:      Effort: Pulmonary effort is normal. No retractions.      Breath sounds: Normal breath sounds. No wheezing, rhonchi or rales.   Chest:      Chest wall: No mass or tenderness.      Breasts:         Right: Normal. No mass, nipple discharge, skin change or tenderness.         Left: Normal. No mass, nipple discharge, skin change or tenderness.      Comments: 1 cm nodular area in left under areola  Abdominal:      General: Bowel sounds are normal.      Palpations: Abdomen is soft. Abdomen is not rigid. There is no mass.      Tenderness: There is no abdominal tenderness. There is no guarding.      Hernia: No hernia is present. There is no hernia in the left inguinal area.   Genitourinary:     Labia:         Right: No rash, tenderness or lesion.         Left: No rash, tenderness or lesion.       Vagina: Normal. No vaginal discharge or lesions.      Cervix: No cervical motion tenderness, discharge, lesion or cervical bleeding.      Uterus: Normal. Not enlarged, " not fixed and not tender.       Adnexa:         Right: No mass or tenderness.          Left: No mass or tenderness.        Rectum: No external hemorrhoid.   Neurological:      Mental Status: She is alert and oriented to person, place, and time.   Psychiatric:         Behavior: Behavior normal.         Assessment/Plan     Encounter Diagnoses   Name Primary?   • Women's annual routine gynecological examination Yes   • Encounter for breast cancer screening using non-mammogram modality    • Bloating    • Breast nodule    • JUVENAL (generalized anxiety disorder)    • Need for HPV vaccination          Plan     1. Recommended use of Vitamin D replacement and getting adequate calcium in her diet. (1500mg)  2. Reviewed monthly self breast exams.  Instructed to call with lumps, pain, or breast discharge.  I do feel the area in the left and will proceed with dx imaging.  Agree this is likely fibrocystic changes  3. Continue yearly mammography  4. Reviewed HPV guidelines.  First HPV vaccine today.  5. Reviewed exercise as a preventative health measures.   6. JUVENAL - she has had generalized increased mood issues and situational anxiety. Discussed options and she would like to try something.  Encouraged consideration of zoom counseling as well.  F/U in 3-6 months after starting meds.  Discussed pros/cons, risks/benefits.      Kimberly Stein MD  12/17/2020

## 2020-12-18 LAB
25(OH)D3+25(OH)D2 SERPL-MCNC: 34.5 NG/ML (ref 30–100)
ALBUMIN SERPL-MCNC: 5 G/DL (ref 3.5–5.2)
ALBUMIN/GLOB SERPL: 2 G/DL
ALP SERPL-CCNC: 44 U/L (ref 39–117)
ALT SERPL-CCNC: 12 U/L (ref 1–33)
AST SERPL-CCNC: 19 U/L (ref 1–32)
BILIRUB SERPL-MCNC: 0.2 MG/DL (ref 0–1.2)
BUN SERPL-MCNC: 13 MG/DL (ref 6–20)
BUN/CREAT SERPL: 17.8 (ref 7–25)
CALCIUM SERPL-MCNC: 9.9 MG/DL (ref 8.6–10.5)
CHLORIDE SERPL-SCNC: 100 MMOL/L (ref 98–107)
CO2 SERPL-SCNC: 26 MMOL/L (ref 22–29)
CREAT SERPL-MCNC: 0.73 MG/DL (ref 0.57–1)
ERYTHROCYTE [DISTWIDTH] IN BLOOD BY AUTOMATED COUNT: 12.4 % (ref 12.3–15.4)
FOLATE SERPL-MCNC: 14.2 NG/ML (ref 4.78–24.2)
GLOBULIN SER CALC-MCNC: 2.5 GM/DL
GLUCOSE SERPL-MCNC: 90 MG/DL (ref 65–99)
HBA1C MFR BLD: 5.2 % (ref 4.8–5.6)
HCT VFR BLD AUTO: 43.3 % (ref 34–46.6)
HCV AB S/CO SERPL IA: <0.1 S/CO RATIO (ref 0–0.9)
HGB BLD-MCNC: 14.5 G/DL (ref 12–15.9)
MCH RBC QN AUTO: 30.7 PG (ref 26.6–33)
MCHC RBC AUTO-ENTMCNC: 33.5 G/DL (ref 31.5–35.7)
MCV RBC AUTO: 91.7 FL (ref 79–97)
PLATELET # BLD AUTO: 162 10*3/MM3 (ref 140–450)
POTASSIUM SERPL-SCNC: 3.8 MMOL/L (ref 3.5–5.2)
PROT SERPL-MCNC: 7.5 G/DL (ref 6–8.5)
RBC # BLD AUTO: 4.72 10*6/MM3 (ref 3.77–5.28)
SODIUM SERPL-SCNC: 140 MMOL/L (ref 136–145)
T4 FREE SERPL-MCNC: 1.25 NG/DL (ref 0.93–1.7)
TSH SERPL DL<=0.005 MIU/L-ACNC: 1.3 UIU/ML (ref 0.27–4.2)
VIT B12 SERPL-MCNC: 707 PG/ML (ref 211–946)
WBC # BLD AUTO: 5.48 10*3/MM3 (ref 3.4–10.8)

## 2021-01-11 ENCOUNTER — TELEPHONE (OUTPATIENT)
Dept: OBSTETRICS AND GYNECOLOGY | Facility: CLINIC | Age: 41
End: 2021-01-11

## 2021-01-12 ENCOUNTER — TELEPHONE (OUTPATIENT)
Dept: OBSTETRICS AND GYNECOLOGY | Facility: CLINIC | Age: 41
End: 2021-01-12

## 2021-01-13 ENCOUNTER — TELEPHONE (OUTPATIENT)
Dept: OBSTETRICS AND GYNECOLOGY | Facility: CLINIC | Age: 41
End: 2021-01-13

## 2021-01-13 NOTE — TELEPHONE ENCOUNTER
She said [facility] reviewed all her labs and that there was no pap sent to [facility] this year.  This is following guidelines

## 2021-01-26 ENCOUNTER — HOSPITAL ENCOUNTER (OUTPATIENT)
Dept: MAMMOGRAPHY | Facility: HOSPITAL | Age: 41
Discharge: HOME | End: 2021-01-26

## 2021-01-26 ENCOUNTER — HOSPITAL ENCOUNTER (OUTPATIENT)
Dept: ULTRASOUND IMAGING | Facility: HOSPITAL | Age: 41
Discharge: HOME | End: 2021-01-26

## 2021-01-26 DIAGNOSIS — N63.0 BREAST NODULE: ICD-10-CM

## 2021-01-26 PROCEDURE — G0279 TOMOSYNTHESIS, MAMMO: HCPCS

## 2021-01-26 PROCEDURE — 77066 DX MAMMO INCL CAD BI: CPT | Performed by: RADIOLOGY

## 2021-01-26 PROCEDURE — 77066 DX MAMMO INCL CAD BI: CPT

## 2021-01-26 PROCEDURE — 76642 ULTRASOUND BREAST LIMITED: CPT | Performed by: RADIOLOGY

## 2021-01-26 PROCEDURE — 77062 BREAST TOMOSYNTHESIS BI: CPT | Performed by: RADIOLOGY

## 2021-01-26 PROCEDURE — 76642 ULTRASOUND BREAST LIMITED: CPT

## 2021-02-24 ENCOUNTER — CLINICAL SUPPORT (OUTPATIENT)
Dept: OBSTETRICS AND GYNECOLOGY | Facility: CLINIC | Age: 41
End: 2021-02-24

## 2021-02-24 VITALS
WEIGHT: 124.2 LBS | HEIGHT: 63 IN | SYSTOLIC BLOOD PRESSURE: 114 MMHG | DIASTOLIC BLOOD PRESSURE: 74 MMHG | BODY MASS INDEX: 22.01 KG/M2

## 2021-02-24 DIAGNOSIS — Z23 NEED FOR VACCINATION: Primary | ICD-10-CM

## 2021-02-24 PROCEDURE — 90651 9VHPV VACCINE 2/3 DOSE IM: CPT | Performed by: NURSE PRACTITIONER

## 2021-02-24 PROCEDURE — 90471 IMMUNIZATION ADMIN: CPT | Performed by: NURSE PRACTITIONER

## 2021-02-24 NOTE — PROGRESS NOTES
HPV Injection Note  Kimberly Booth is a 40 y.o. female here for her 2nd Gardasil HPV vaccination. Her LMP was 1/27/2021.     She has received and reviewed the Gardasil Information Sheet. Contraindications have been reviewed.    Information Reviewed  Emerita Wang RN reviewed the possible side effects of pain, swelling, itching at the injection site, fever, difficulty breathing and anaphylactic reactions. The visit involved 15 minutes of counseling on STD prevention and vaccine benefits.    Injection Details  NDC: 1587-6868-88  Lot #: [id number]  Expiration: 11/15/2022  Site: right deltoid  Office supply    Future Injections  Her next injection is in 4 months: 6/17/2021-6/24/2021.     Toleration  Patient tolerated the injection well with no complaints. Remained at patient side for 15 minutes, post-injection, to ensure no allergic reaction or dizziness/fainting. Patient ambulated to check-out with steady gait.    Electronically Signed By:  Emerita Wang RN  02/24/2021

## 2021-08-31 ENCOUNTER — CLINICAL SUPPORT (OUTPATIENT)
Dept: OBSTETRICS AND GYNECOLOGY | Facility: CLINIC | Age: 41
End: 2021-08-31

## 2021-08-31 VITALS
BODY MASS INDEX: 22.79 KG/M2 | WEIGHT: 128.6 LBS | SYSTOLIC BLOOD PRESSURE: 110 MMHG | DIASTOLIC BLOOD PRESSURE: 72 MMHG | HEIGHT: 63 IN

## 2021-08-31 DIAGNOSIS — Z23 NEED FOR HPV VACCINATION: Primary | ICD-10-CM

## 2021-08-31 PROCEDURE — 90471 IMMUNIZATION ADMIN: CPT | Performed by: NURSE PRACTITIONER

## 2021-08-31 PROCEDURE — 90651 9VHPV VACCINE 2/3 DOSE IM: CPT | Performed by: NURSE PRACTITIONER

## 2022-01-02 ENCOUNTER — TELEMEDICINE (OUTPATIENT)
Dept: FAMILY MEDICINE CLINIC | Facility: TELEHEALTH | Age: 42
End: 2022-01-02

## 2022-01-02 VITALS — BODY MASS INDEX: 22.68 KG/M2 | HEIGHT: 63 IN | TEMPERATURE: 97.7 F | WEIGHT: 128 LBS

## 2022-01-02 DIAGNOSIS — N39.0 URINARY TRACT INFECTION WITHOUT HEMATURIA, SITE UNSPECIFIED: Primary | ICD-10-CM

## 2022-01-02 PROCEDURE — 99213 OFFICE O/P EST LOW 20 MIN: CPT | Performed by: NURSE PRACTITIONER

## 2022-01-02 RX ORDER — NITROFURANTOIN 25; 75 MG/1; MG/1
100 CAPSULE ORAL 2 TIMES DAILY
Qty: 14 CAPSULE | Refills: 0 | Status: SHIPPED | OUTPATIENT
Start: 2022-01-02 | End: 2022-01-09

## 2022-01-02 NOTE — PROGRESS NOTES
"You have chosen to receive care through a telehealth visit.  Do you consent to use a video/audio connection for your medical care today? Yes     CHIEF COMPLAINT  Cc: urinary problems    HPI  Kimberly Booth is a 41 y.o. female  presents with complaint of urinary problems. Her symptoms include; discomfort with urination. urinary frequency, urinary urgency and mild flank, pain. She did an at home urine dipstick test yesterday which was questionable and then she did one today which was positive. She did take azo for her symptoms which started 2 days ago. She has had a urianry tract infection in the past but it has been a long time. No genital lesions. No abnormal vaginal discharge.    Review of Systems   Constitutional: Negative for chills, fatigue and fever.   HENT: Negative for congestion and sore throat.    Respiratory: Negative for cough, chest tightness, shortness of breath and wheezing.    Cardiovascular: Negative for chest pain.   Gastrointestinal: Negative for diarrhea, nausea and vomiting.   Genitourinary: Positive for dysuria, flank pain (mild), frequency and urgency. Negative for genital sores, hematuria and vaginal discharge.   Musculoskeletal: Negative for myalgias.   Neurological: Negative for headaches.       History reviewed. No pertinent past medical history.    Family History   Problem Relation Age of Onset   • Hypertension Maternal Grandmother    • Breast cancer Neg Hx    • Ovarian cancer Neg Hx        Social History     Socioeconomic History   • Marital status:    Tobacco Use   • Smoking status: Never Smoker   • Smokeless tobacco: Never Used   Substance and Sexual Activity   • Alcohol use: No     Comment: Per [place], light user   • Drug use: No   • Sexual activity: Yes     Birth control/protection: Partner's vasectomy         Temp 97.7 °F (36.5 °C)   Ht 160 cm (63\")   Wt 58.1 kg (128 lb)   Breastfeeding No   BMI 22.67 kg/m²     PHYSICAL EXAM  Physical Exam   Constitutional: She is " oriented to person, place, and time. She appears well-developed and well-nourished.   HENT:   Head: Normocephalic and atraumatic.   Right Ear: External ear normal.   Left Ear: External ear normal.   Nose: Nose normal.   Mouth/Throat: Mouth/Lips are normal.  Eyes: Lids are normal. Right eye exhibits no discharge and no exudate. Left eye exhibits no discharge and no exudate. Right conjunctiva is not injected. Left conjunctiva is not injected.   Pulmonary/Chest: No accessory muscle usage. No tachypnea and no bradypnea.  No respiratory distress.No use of oxygen by nasal cannulaNo use of oxygen by mask noted.  Abdominal: There is CVA tenderness.   Neurological: She is alert and oriented to person, place, and time. No cranial nerve deficit.   Skin: Her skin appears normal.  Psychiatric: She has a normal mood and affect. Her speech is normal and behavior is normal. Judgment and thought content normal.       Results for orders placed or performed in visit on 12/17/20   CBC (No Diff)    Specimen: Blood   Result Value Ref Range    WBC 5.48 3.40 - 10.80 10*3/mm3    RBC 4.72 3.77 - 5.28 10*6/mm3    Hemoglobin 14.5 12.0 - 15.9 g/dL    Hematocrit 43.3 34.0 - 46.6 %    MCV 91.7 79.0 - 97.0 fL    MCH 30.7 26.6 - 33.0 pg    MCHC 33.5 31.5 - 35.7 g/dL    RDW 12.4 12.3 - 15.4 %    Platelets 162 140 - 450 10*3/mm3   Comprehensive Metabolic Panel    Specimen: Blood   Result Value Ref Range    Glucose 90 65 - 99 mg/dL    BUN 13 6 - 20 mg/dL    Creatinine 0.73 0.57 - 1.00 mg/dL    eGFR Non African Am 89 >60 mL/min/1.73    eGFR African Am 108 >60 mL/min/1.73    BUN/Creatinine Ratio 17.8 7.0 - 25.0    Sodium 140 136 - 145 mmol/L    Potassium 3.8 3.5 - 5.2 mmol/L    Chloride 100 98 - 107 mmol/L    Total CO2 26.0 22.0 - 29.0 mmol/L    Calcium 9.9 8.6 - 10.5 mg/dL    Total Protein 7.5 6.0 - 8.5 g/dL    Albumin 5.00 3.50 - 5.20 g/dL    Globulin 2.5 gm/dL    A/G Ratio 2.0 g/dL    Total Bilirubin 0.2 0.0 - 1.2 mg/dL    Alkaline Phosphatase 44 39  - 117 U/L    AST (SGOT) 19 1 - 32 U/L    ALT (SGPT) 12 1 - 33 U/L   Hemoglobin A1c    Specimen: Blood   Result Value Ref Range    Hemoglobin A1C 5.20 4.80 - 5.60 %   Hepatitis C Antibody    Specimen: Blood   Result Value Ref Range    Hep C Virus Ab <0.1 0.0 - 0.9 s/co ratio   T4, Free    Specimen: Blood   Result Value Ref Range    Free T4 1.25 0.93 - 1.70 ng/dL   TSH    Specimen: Blood   Result Value Ref Range    TSH 1.300 0.270 - 4.200 uIU/mL   Vitamin D 25 Hydroxy    Specimen: Blood   Result Value Ref Range    25 Hydroxy, Vitamin D 34.5 30.0 - 100.0 ng/ml   Vitamin B12    Specimen: Blood   Result Value Ref Range    Vitamin B-12 707 211 - 946 pg/mL   Folate    Specimen: Blood   Result Value Ref Range    Folate 14.20 4.78 - 24.20 ng/mL       Diagnoses and all orders for this visit:    1. Urinary tract infection without hematuria, site unspecified (Primary)    Other orders  -     nitrofurantoin, macrocrystal-monohydrate, (MACROBID) 100 MG capsule; Take 1 capsule by mouth 2 (Two) Times a Day for 7 days.  Dispense: 14 capsule; Refill: 0    Probiotics for two weeks related to taking antibiotics. The pharmacist can help you with this if needed.  Drink plenty of of clear decaffeinated fluidsf no relief of Wipe front to back    FOLLOW-UP  If symptoms worsen or persist follow up with PCP.Virtual Care or Urgent Care    Patient verbalizes understanding of medication dosage, comfort measures, instructions for treatment and follow-up.    Promise Biggs, APRN  01/02/2022  08:59 EST    This visit was performed via Telehealth.  This patient has been instructed to follow-up with their primary care provider if their symptoms worsen or the treatment provided does not resolve their illness.

## 2022-01-02 NOTE — PATIENT INSTRUCTIONS
Urinary Tract Infection, Adult    A urinary tract infection (UTI) is an infection of any part of the urinary tract. The urinary tract includes the kidneys, ureters, bladder, and urethra. These organs make, store, and get rid of urine in the body.  Your health care provider may use other names to describe the infection. An upper UTI affects the ureters and kidneys (pyelonephritis). A lower UTI affects the bladder (cystitis) and urethra (urethritis).  What are the causes?  Most urinary tract infections are caused by bacteria in your genital area, around the entrance to your urinary tract (urethra). These bacteria grow and cause inflammation of your urinary tract.  What increases the risk?  You are more likely to develop this condition if:  · You have a urinary catheter that stays in place (indwelling).  · You are not able to control when you urinate or have a bowel movement (you have incontinence).  · You are female and you:  ? Use a spermicide or diaphragm for birth control.  ? Have low estrogen levels.  ? Are pregnant.  · You have certain genes that increase your risk (genetics).  · You are sexually active.  · You take antibiotic medicines.  · You have a condition that causes your flow of urine to slow down, such as:  ? An enlarged prostate, if you are male.  ? Blockage in your urethra (stricture).  ? A kidney stone.  ? A nerve condition that affects your bladder control (neurogenic bladder).  ? Not getting enough to drink, or not urinating often.  · You have certain medical conditions, such as:  ? Diabetes.  ? A weak disease-fighting system (immunesystem).  ? Sickle cell disease.  ? Gout.  ? Spinal cord injury.  What are the signs or symptoms?  Symptoms of this condition include:  · Needing to urinate right away (urgently).  · Frequent urination or passing small amounts of urine frequently.  · Pain or burning with urination.  · Blood in the urine.  · Urine that smells bad or unusual.  · Trouble urinating.  · Cloudy  urine.  · Vaginal discharge, if you are female.  · Pain in the abdomen or the lower back.  You may also have:  · Vomiting or a decreased appetite.  · Confusion.  · Irritability or tiredness.  · A fever.  · Diarrhea.  The first symptom in older adults may be confusion. In some cases, they may not have any symptoms until the infection has worsened.  How is this diagnosed?  This condition is diagnosed based on your medical history and a physical exam. You may also have other tests, including:  · Urine tests.  · Blood tests.  · Tests for sexually transmitted infections (STIs).  If you have had more than one UTI, a cystoscopy or imaging studies may be done to determine the cause of the infections.  How is this treated?  Treatment for this condition includes:  · Antibiotic medicine.  · Over-the-counter medicines to treat discomfort.  · Drinking enough water to stay hydrated.  If you have frequent infections or have other conditions such as a kidney stone, you may need to see a health care provider who specializes in the urinary tract (urologist).  In rare cases, urinary tract infections can cause sepsis. Sepsis is a life-threatening condition that occurs when the body responds to an infection. Sepsis is treated in the hospital with IV antibiotics, fluids, and other medicines.  Follow these instructions at home:    Medicines  · Take over-the-counter and prescription medicines only as told by your health care provider.  · If you were prescribed an antibiotic medicine, take it as told by your health care provider. Do not stop using the antibiotic even if you start to feel better.  General instructions  · Make sure you:  ? Empty your bladder often and completely. Do not hold urine for long periods of time.  ? Empty your bladder after sex.  ? Wipe from front to back after a bowel movement if you are female. Use each tissue one time when you wipe.  · Drink enough fluid to keep your urine pale yellow.  · Keep all follow-up  visits as told by your health care provider. This is important.  Contact a health care provider if:  · Your symptoms do not get better after 1-2 days.  · Your symptoms go away and then return.  Get help right away if you have:  · Severe pain in your back or your lower abdomen.  · A fever.  · Nausea or vomiting.  Summary  · A urinary tract infection (UTI) is an infection of any part of the urinary tract, which includes the kidneys, ureters, bladder, and urethra.  · Most urinary tract infections are caused by bacteria in your genital area, around the entrance to your urinary tract (urethra).  · Treatment for this condition often includes antibiotic medicines.  · If you were prescribed an antibiotic medicine, take it as told by your health care provider. Do not stop using the antibiotic even if you start to feel better.  · Keep all follow-up visits as told by your health care provider. This is important.  This information is not intended to replace advice given to you by your health care provider. Make sure you discuss any questions you have with your health care provider.  Document Revised: 12/05/2019 Document Reviewed: 06/27/2019  [employer] Patient Education © 2021 [employer]

## 2022-01-18 ENCOUNTER — OFFICE VISIT (OUTPATIENT)
Dept: OBSTETRICS AND GYNECOLOGY | Facility: CLINIC | Age: 42
End: 2022-01-18

## 2022-01-18 VITALS
BODY MASS INDEX: 22.36 KG/M2 | WEIGHT: 126.2 LBS | SYSTOLIC BLOOD PRESSURE: 125 MMHG | HEIGHT: 63 IN | DIASTOLIC BLOOD PRESSURE: 82 MMHG

## 2022-01-18 DIAGNOSIS — Z12.31 SCREENING MAMMOGRAM FOR BREAST CANCER: ICD-10-CM

## 2022-01-18 DIAGNOSIS — Z01.419 WOMEN'S ANNUAL ROUTINE GYNECOLOGICAL EXAMINATION: Primary | ICD-10-CM

## 2022-01-18 PROCEDURE — 99396 PREV VISIT EST AGE 40-64: CPT | Performed by: NURSE PRACTITIONER

## 2022-01-18 NOTE — PROGRESS NOTES
GYN Annual Exam     CC - Here for annual exam.        HPI  Kimberly Booth is a 41 y.o. female, , who presents for annual well woman exam. Patient's last menstrual period was 2022..  Periods are regular every 25-35 days, lasting 5 days. .  Dysmenorrhea:mild, occurring first 1-2 days of flow.  Patient reports problems with: none. There were no changes to her medical or surgical history since her last visit.. Partner Status: Marital Status: .  New Partners since last visit: no.  Desires STD Screening: no.    Additional OB/GYN History   Current contraception: contraceptive methods: Vasectomy   Desires to: do not start contraception  Last Pap :   Last Completed Pap Smear          Ordered - PAP SMEAR (Every 3 Years) Ordered on 2022    10/16/2019  Done - negative, HPV negative              History of abnormal Pap smear: no  Family history of uterine, colon, breast, or ovarian cancer: no  Performs monthly Self-Breast Exam: yes  Last mammogram: 2021. Done at .    Last Completed Mammogram          MAMMOGRAM (Yearly) Next due on 2021  Mammo Diagnostic Digital Tomosynthesis Bilateral With CAD               Exercises Regularly: yes  Feelings of Anxiety or Depression: no  Tobacco Usage?: No   OB History        3    Para   3    Term   3       0    AB   0    Living   3       SAB   0    IAB   0    Ectopic   0    Molar        Multiple   0    Live Births   3                Health Maintenance   Topic Date Due   • ANNUAL PHYSICAL  Never done   • COVID-19 Vaccine (1) Never done   • TDAP/TD VACCINES (1 - Tdap) Never done   • INFLUENZA VACCINE  2021   • Annual Gynecologic Pelvic and Breast Exam  2021   • MAMMOGRAM  2022   • PAP SMEAR  10/16/2022   • HEPATITIS C SCREENING  Completed   • Pneumococcal Vaccine 0-64  Aged Out       The additional following portions of the patient's history were reviewed and updated as appropriate: allergies, current  "medications, past family history, past medical history, past social history and past surgical history.    Review of Systems   Constitutional: Negative.    Respiratory: Negative.    Gastrointestinal: Negative.    Genitourinary: Negative.    Neurological: Negative.    Psychiatric/Behavioral: Negative.          I have reviewed and agree with the HPI, ROS, and historical information as entered above. Denise Michelle, APRN    Objective   /82   Ht 160 cm (63\")   Wt 57.2 kg (126 lb 3.2 oz)   LMP 01/08/2022   BMI 22.36 kg/m²     Physical Exam  Vitals and nursing note reviewed. Exam conducted with a chaperone present.   Constitutional:       Appearance: She is well-developed.   HENT:      Head: Normocephalic and atraumatic.   Neck:      Thyroid: No thyroid mass or thyromegaly.   Cardiovascular:      Rate and Rhythm: Normal rate and regular rhythm.      Heart sounds: No murmur heard.      Pulmonary:      Effort: Pulmonary effort is normal. No retractions.      Breath sounds: Normal breath sounds. No wheezing, rhonchi or rales.   Chest:      Chest wall: No mass or tenderness.   Breasts:      Right: Normal. No mass, nipple discharge, skin change or tenderness.      Left: Tenderness present. No mass, nipple discharge or skin change.        Comments: H/O of  6mm simple cyst at 4 o'clock position found on Breast ultrasound in 1/2021. Continues to have breast pain in same area prior to start of periods   Abdominal:      General: Bowel sounds are normal.      Palpations: Abdomen is soft. Abdomen is not rigid. There is no mass.      Tenderness: There is no abdominal tenderness. There is no guarding.      Hernia: No hernia is present.   Genitourinary:     General: Normal vulva.      Labia:         Right: No rash, tenderness or lesion.         Left: No rash, tenderness or lesion.       Vagina: Normal. No vaginal discharge or lesions.      Cervix: No cervical motion tenderness, discharge, lesion or cervical bleeding.      " Uterus: Normal. Not enlarged, not fixed and not tender.       Adnexa: Right adnexa normal and left adnexa normal.        Right: No mass or tenderness.          Left: No mass or tenderness.        Rectum: Normal. No external hemorrhoid.   Musculoskeletal:      Cervical back: Normal range of motion. No muscular tenderness.   Neurological:      Mental Status: She is alert and oriented to person, place, and time.   Psychiatric:         Behavior: Behavior normal.            Assessment and Plan    Problem List Items Addressed This Visit     None      Visit Diagnoses     Women's annual routine gynecological examination    -  Primary    Relevant Orders    Pap IG, HPV-hr          1. GYN annual well woman exam.   2. Vasectomy for contraception  3. Pt. Request annual labs today. She does not have a PCP but discussed her getting one.   4. Reviewed monthly self breast exams.  Instructed to call with lumps, pain, or breast discharge.    5. Ordered Mammogram today  6. Reviewed exercise as a preventative health measures.   7. Reccommended Flu Vaccine in Fall of each year.  8. RTC in 1 year or PRN with problems.  9. No follow-ups on file.     Denise Michelle, APRN  01/18/2022

## 2022-01-19 LAB
25(OH)D3+25(OH)D2 SERPL-MCNC: 40.1 NG/ML (ref 30–100)
ALBUMIN SERPL-MCNC: 5 G/DL (ref 3.5–5.2)
ALBUMIN/GLOB SERPL: 2.3 G/DL
ALP SERPL-CCNC: 40 U/L (ref 39–117)
ALT SERPL-CCNC: 14 U/L (ref 1–33)
AST SERPL-CCNC: 18 U/L (ref 1–32)
BASOPHILS # BLD AUTO: 0.04 10*3/MM3 (ref 0–0.2)
BASOPHILS NFR BLD AUTO: 0.7 % (ref 0–1.5)
BILIRUB SERPL-MCNC: 0.3 MG/DL (ref 0–1.2)
BUN SERPL-MCNC: 13 MG/DL (ref 6–20)
BUN/CREAT SERPL: 16.9 (ref 7–25)
CALCIUM SERPL-MCNC: 9.4 MG/DL (ref 8.6–10.5)
CHLORIDE SERPL-SCNC: 101 MMOL/L (ref 98–107)
CHOLEST SERPL-MCNC: 369 MG/DL (ref 0–200)
CO2 SERPL-SCNC: 26.1 MMOL/L (ref 22–29)
CREAT SERPL-MCNC: 0.77 MG/DL (ref 0.57–1)
EOSINOPHIL # BLD AUTO: 0.02 10*3/MM3 (ref 0–0.4)
EOSINOPHIL NFR BLD AUTO: 0.3 % (ref 0.3–6.2)
ERYTHROCYTE [DISTWIDTH] IN BLOOD BY AUTOMATED COUNT: 12.9 % (ref 12.3–15.4)
GLOBULIN SER CALC-MCNC: 2.2 GM/DL
GLUCOSE SERPL-MCNC: 84 MG/DL (ref 65–99)
HBA1C MFR BLD: 5.1 % (ref 4.8–5.6)
HCT VFR BLD AUTO: 43.8 % (ref 34–46.6)
HDLC SERPL-MCNC: 117 MG/DL (ref 40–60)
HGB BLD-MCNC: 14.6 G/DL (ref 12–15.9)
IMM GRANULOCYTES # BLD AUTO: 0.02 10*3/MM3 (ref 0–0.05)
IMM GRANULOCYTES NFR BLD AUTO: 0.3 % (ref 0–0.5)
LDLC SERPL CALC-MCNC: 238 MG/DL (ref 0–100)
LYMPHOCYTES # BLD AUTO: 1.15 10*3/MM3 (ref 0.7–3.1)
LYMPHOCYTES NFR BLD AUTO: 19.7 % (ref 19.6–45.3)
MCH RBC QN AUTO: 30.7 PG (ref 26.6–33)
MCHC RBC AUTO-ENTMCNC: 33.3 G/DL (ref 31.5–35.7)
MCV RBC AUTO: 92.2 FL (ref 79–97)
MONOCYTES # BLD AUTO: 0.38 10*3/MM3 (ref 0.1–0.9)
MONOCYTES NFR BLD AUTO: 6.5 % (ref 5–12)
NEUTROPHILS # BLD AUTO: 4.24 10*3/MM3 (ref 1.7–7)
NEUTROPHILS NFR BLD AUTO: 72.5 % (ref 42.7–76)
NRBC BLD AUTO-RTO: 0 /100 WBC (ref 0–0.2)
PLATELET # BLD AUTO: 179 10*3/MM3 (ref 140–450)
POTASSIUM SERPL-SCNC: 3.9 MMOL/L (ref 3.5–5.2)
PROT SERPL-MCNC: 7.2 G/DL (ref 6–8.5)
RBC # BLD AUTO: 4.75 10*6/MM3 (ref 3.77–5.28)
SODIUM SERPL-SCNC: 141 MMOL/L (ref 136–145)
TRIGL SERPL-MCNC: 91 MG/DL (ref 0–150)
TSH SERPL DL<=0.005 MIU/L-ACNC: 1.64 UIU/ML (ref 0.27–4.2)
VLDLC SERPL CALC-MCNC: 14 MG/DL (ref 5–40)
WBC # BLD AUTO: 5.85 10*3/MM3 (ref 3.4–10.8)

## 2022-02-02 DIAGNOSIS — Z01.419 WOMEN'S ANNUAL ROUTINE GYNECOLOGICAL EXAMINATION: ICD-10-CM

## 2022-02-15 ENCOUNTER — HOSPITAL ENCOUNTER (OUTPATIENT)
Dept: MAMMOGRAPHY | Facility: HOSPITAL | Age: 42
Discharge: HOME | End: 2022-02-15
Admitting: NURSE PRACTITIONER

## 2022-02-15 DIAGNOSIS — Z12.31 SCREENING MAMMOGRAM FOR BREAST CANCER: ICD-10-CM

## 2022-02-15 PROCEDURE — 77067 SCR MAMMO BI INCL CAD: CPT | Performed by: RADIOLOGY

## 2022-02-15 PROCEDURE — 77063 BREAST TOMOSYNTHESIS BI: CPT

## 2022-02-15 PROCEDURE — 77063 BREAST TOMOSYNTHESIS BI: CPT | Performed by: RADIOLOGY

## 2022-02-15 PROCEDURE — 77067 SCR MAMMO BI INCL CAD: CPT

## 2023-01-24 ENCOUNTER — OFFICE VISIT (OUTPATIENT)
Dept: OBSTETRICS AND GYNECOLOGY | Facility: CLINIC | Age: 43
End: 2023-01-24
Payer: COMMERCIAL

## 2023-01-24 VITALS
HEIGHT: 63 IN | DIASTOLIC BLOOD PRESSURE: 60 MMHG | SYSTOLIC BLOOD PRESSURE: 118 MMHG | WEIGHT: 120.4 LBS | BODY MASS INDEX: 21.33 KG/M2

## 2023-01-24 DIAGNOSIS — Z12.31 SCREENING MAMMOGRAM FOR BREAST CANCER: ICD-10-CM

## 2023-01-24 DIAGNOSIS — Z01.419 ROUTINE GYNECOLOGICAL EXAMINATION: Primary | ICD-10-CM

## 2023-01-24 PROCEDURE — 99396 PREV VISIT EST AGE 40-64: CPT | Performed by: NURSE PRACTITIONER

## 2023-01-24 RX ORDER — MULTIPLE VITAMINS W/ MINERALS TAB 9MG-400MCG
1 TAB ORAL DAILY
COMMUNITY

## 2023-01-24 NOTE — PROGRESS NOTES
Gynecologic Annual Exam Note          GYN Annual Exam     Gynecologic Exam        Subjective     HPI  Kimberly Booth is a 42 y.o. female, , who presents for annual well woman exam as a established patient . Patient's last menstrual period was 01/10/2023 (exact date).  Patient reports problems with: none.  Her periods occur every 25-35 days , lasting 5 days. The flow is moderate.. She reports dysmenorrhea is none. Partner Status: Marital Status: . She is is sexually active. She has not had new partners. STD testing recommendations have been explained to the patient and she does not desire STD testing. There were no changes to her medical or surgical history since her last visit.     P&C waiver reviewed and signed     Additional OB/GYN History   Current contraception: contraceptive methods: Vasectomy   Desires to: do not start contraception    Last Pap : 22. Result: negative. HPV: negative.   Last Completed Pap Smear          PAP SMEAR (Every 3 Years) Next due on 2022  Pap IG, HPV-hr    10/16/2019  Done - negative, HPV negative              History of abnormal Pap smear: no  Family history of uterine, colon, breast, or ovarian cancer: no  Performs monthly Self-Breast Exam: yes  Last mammogram: 2/15/22 Done at [facility].    Last Completed Mammogram          Ordered - MAMMOGRAM (Yearly) Ordered on 2023    02/15/2022  Mammo Screening Digital Tomosynthesis Bilateral With CAD    2021  Mammo Diagnostic Digital Tomosynthesis Bilateral With CAD                History of abnormal mammogram: yes - led to diagnostic mammogram    Colonoscopy: has never had a colonoscopy.  Exercises Regularly: yes- walking  Feelings of Anxiety or Depression: no  Tobacco Usage?: No       Current Outpatient Medications:   •  multivitamin with minerals tablet tablet, Take 1 tablet by mouth Daily., Disp: , Rfl:      Patient denies the need for medication refills today.    OB History       "  3    Para   3    Term   3       0    AB   0    Living   3       SAB   0    IAB   0    Ectopic   0    Molar        Multiple   0    Live Births   3                History reviewed. No pertinent past medical history.     Past Surgical History:   Procedure Laterality Date   • TONSILLECTOMY     • WISDOM TOOTH EXTRACTION         Health Maintenance   Topic Date Due   • COVID-19 Vaccine (1) Never done   • TDAP/TD VACCINES (1 - Tdap) Never done   • ANNUAL PHYSICAL  Never done   • INFLUENZA VACCINE  2022   • Annual Gynecologic Pelvic and Breast Exam  2023   • MAMMOGRAM  02/15/2023   • PAP SMEAR  2025   • HEPATITIS C SCREENING  Completed   • Pneumococcal Vaccine 0-64  Aged Out       The additional following portions of the patient's history were reviewed and updated as appropriate: allergies, current medications, past family history, past medical history, past social history, past surgical history and problem list.    Review of Systems   Constitutional: Negative.    Cardiovascular: Negative.    Gastrointestinal: Negative.    Genitourinary: Negative.    Psychiatric/Behavioral: Negative.          I have reviewed and agree with the HPI, ROS, and historical information as entered above. Denise Michelle, APRN      Objective   /60   Ht 160 cm (63\")   Wt 54.6 kg (120 lb 6.4 oz)   LMP 01/10/2023 (Exact Date)   BMI 21.33 kg/m²     Physical Exam  Vitals and nursing note reviewed. Exam conducted with a chaperone present.   Constitutional:       Appearance: She is well-developed.   HENT:      Head: Normocephalic and atraumatic.   Neck:      Thyroid: No thyroid mass or thyromegaly.   Cardiovascular:      Rate and Rhythm: Normal rate and regular rhythm.      Heart sounds: No murmur heard.  Pulmonary:      Effort: Pulmonary effort is normal. No retractions.      Breath sounds: Normal breath sounds. No wheezing, rhonchi or rales.   Chest:      Chest wall: No mass or tenderness.   Breasts:     Right: Normal. " No mass, nipple discharge, skin change or tenderness.      Left: Normal. No mass, nipple discharge, skin change or tenderness.   Abdominal:      Palpations: Abdomen is soft. Abdomen is not rigid. There is no mass.      Tenderness: There is no abdominal tenderness. There is no guarding.      Hernia: No hernia is present.   Genitourinary:     General: Normal vulva.      Labia:         Right: No rash, tenderness or lesion.         Left: No rash, tenderness or lesion.       Vagina: Normal. No vaginal discharge or lesions.      Cervix: No cervical motion tenderness, discharge, lesion or cervical bleeding.      Uterus: Normal. Not enlarged, not fixed and not tender.       Adnexa: Right adnexa normal and left adnexa normal.        Right: No mass or tenderness.          Left: No mass or tenderness.        Rectum: Normal. No external hemorrhoid.   Musculoskeletal:      Cervical back: Normal range of motion. No muscular tenderness.   Neurological:      Mental Status: She is alert and oriented to person, place, and time.   Psychiatric:         Behavior: Behavior normal.            Assessment and Plan    Problem List Items Addressed This Visit    None  Visit Diagnoses     Routine gynecological examination    -  Primary    Screening mammogram for breast cancer        Relevant Orders    Mammo Screening Digital Tomosynthesis Bilateral With CAD          1. GYN annual well woman exam.   2. Vasectomy for contraception  3. Pap guidelines reviewed.  4. Reviewed monthly self breast exams.  Instructed to call with lumps, pain, or breast discharge.    5. Ordered Mammogram today  6. Reviewed exercise as a preventative health measures.   7. Reccommended Flu Vaccine in Fall of each year.  8. RTC in 1 year or PRN with problems.      Denise Michelle, APRN  01/24/2023

## 2023-03-28 ENCOUNTER — HOSPITAL ENCOUNTER (OUTPATIENT)
Dept: MAMMOGRAPHY | Facility: HOSPITAL | Age: 43
Discharge: HOME | End: 2023-03-28
Admitting: NURSE PRACTITIONER
Payer: COMMERCIAL

## 2023-03-28 DIAGNOSIS — Z12.31 SCREENING MAMMOGRAM FOR BREAST CANCER: ICD-10-CM

## 2023-03-28 PROCEDURE — 77063 BREAST TOMOSYNTHESIS BI: CPT

## 2023-03-28 PROCEDURE — 77063 BREAST TOMOSYNTHESIS BI: CPT | Performed by: RADIOLOGY

## 2023-03-28 PROCEDURE — 77067 SCR MAMMO BI INCL CAD: CPT

## 2023-03-28 PROCEDURE — 77067 SCR MAMMO BI INCL CAD: CPT | Performed by: RADIOLOGY

## 2024-01-26 ENCOUNTER — OFFICE VISIT (OUTPATIENT)
Dept: OBSTETRICS AND GYNECOLOGY | Facility: CLINIC | Age: 44
End: 2024-01-26
Payer: COMMERCIAL

## 2024-01-26 VITALS
DIASTOLIC BLOOD PRESSURE: 66 MMHG | HEIGHT: 63 IN | WEIGHT: 122 LBS | SYSTOLIC BLOOD PRESSURE: 110 MMHG | BODY MASS INDEX: 21.62 KG/M2

## 2024-01-26 DIAGNOSIS — N94.6 DYSMENORRHEA: ICD-10-CM

## 2024-01-26 DIAGNOSIS — Z12.31 ENCOUNTER FOR SCREENING MAMMOGRAM FOR MALIGNANT NEOPLASM OF BREAST: ICD-10-CM

## 2024-01-26 DIAGNOSIS — Z01.419 WOMEN'S ANNUAL ROUTINE GYNECOLOGICAL EXAMINATION: ICD-10-CM

## 2024-01-26 DIAGNOSIS — Z87.440 HISTORY OF UTI: Primary | ICD-10-CM

## 2024-01-26 LAB
BILIRUB BLD-MCNC: NEGATIVE MG/DL
CLARITY, POC: CLEAR
COLOR UR: YELLOW
GLUCOSE UR STRIP-MCNC: NEGATIVE MG/DL
KETONES UR QL: NEGATIVE
LEUKOCYTE EST, POC: NEGATIVE
NITRITE UR-MCNC: NEGATIVE MG/ML
PH UR: 6.5 [PH] (ref 5–8)
PROT UR STRIP-MCNC: NEGATIVE MG/DL
RBC # UR STRIP: NEGATIVE /UL
SP GR UR: 1 (ref 1–1.03)
UROBILINOGEN UR QL: NORMAL

## 2024-01-26 NOTE — PROGRESS NOTES
Gynecologic Annual Exam Note          GYN Annual Exam     Annual        Subjective     HPI  Kimberly Booth is a 43 y.o. female, , who presents for annual well woman exam as a established patient . Patient's last menstrual period was 2024 (exact date)..  Patient reports problems with: none.  Her periods occur every 25-35 days, lasting 4-5 days. She reports dysmenorrhea is mild, occurring first 1-2 days of flow. Partner Status: Marital Status: . She is is sexually active. She has not had new partners.. STD testing recommendations have been explained to the patient and she does not desire STD testing. There were no changes to her medical or surgical history since her last visit..       Additional OB/GYN History   Current contraception: contraceptive methods: Vasectomy   Desires to: do not start contraception  History of migraines: no    Last Pap : 2022. Result: negative. HPV: negative.   Last Completed Pap Smear            PAP SMEAR (Every 3 Years) Next due on 2022  Pap IG, HPV-hr    10/16/2019  Done - negative, HPV negative                  History of abnormal Pap smear: no  Family history of uterine, colon, breast, or ovarian cancer: no  Performs monthly Self-Breast Exam: yes  Last mammogram: 3/28/2023. Done at [facility].    Last Completed Mammogram            Ordered - MAMMOGRAM (Yearly) Ordered on 2023  Mammo Screening Digital Tomosynthesis Bilateral With CAD    02/15/2022  Mammo Screening Digital Tomosynthesis Bilateral With CAD    2021  Mammo Diagnostic Digital Tomosynthesis Bilateral With CAD                    History of abnormal mammogram: no    Colonoscopy: has never had a colonoscopy.  Exercises Regularly: yes  Feelings of Anxiety or Depression: No  Tobacco Usage?: No       Current Outpatient Medications:     multivitamin with minerals tablet tablet, Take 1 tablet by mouth Daily., Disp: , Rfl:      Patient denies the need  "for medication refills today.    OB History          3    Para   3    Term   3       0    AB   0    Living   3         SAB   0    IAB   0    Ectopic   0    Molar        Multiple   0    Live Births   3                Past Medical History:   Diagnosis Date    Hyperlipidemia     PMS (premenstrual syndrome)     Varicella         Past Surgical History:   Procedure Laterality Date    TONSILLECTOMY      WISDOM TOOTH EXTRACTION         Health Maintenance   Topic Date Due    TDAP/TD VACCINES (1 - Tdap) Never done    ANNUAL PHYSICAL  Never done    INFLUENZA VACCINE  2023    COVID-19 Vaccine (2 - - season) 2023    Annual Gynecologic Pelvic and Breast Exam  2024    LIPID PANEL  2024    MAMMOGRAM  2024    PAP SMEAR  2025    HEPATITIS C SCREENING  Completed    Pneumococcal Vaccine 0-64  Aged Out       The additional following portions of the patient's history were reviewed and updated as appropriate: allergies, current medications, past family history, past medical history, past social history, past surgical history, and problem list.    Review of Systems   Constitutional: Negative.    Respiratory: Negative.     Cardiovascular: Negative.    Gastrointestinal: Negative.    Genitourinary: Negative.    Psychiatric/Behavioral: Negative.           I have reviewed and agree with the HPI, ROS, and historical information as entered above. Nakia Arriola, APRN          Objective   /66   Ht 160 cm (63\")   Wt 55.3 kg (122 lb)   LMP 2024 (Exact Date)   BMI 21.61 kg/m²     Physical Exam  Constitutional:       Appearance: Normal appearance.   Neck:      Thyroid: No thyroid mass or thyromegaly.   Pulmonary:      Effort: Pulmonary effort is normal.   Chest:      Chest wall: No mass.   Breasts:     Right: Normal. No inverted nipple, mass, nipple discharge or skin change.      Left: Normal. No inverted nipple, mass, nipple discharge or skin change.   Abdominal:      General: " There is no distension.      Palpations: Abdomen is soft. There is no mass.      Tenderness: There is no abdominal tenderness.      Hernia: No hernia is present.   Genitourinary:     General: Normal vulva.      Labia:         Right: No rash.         Left: No rash.       Vagina: Normal.      Cervix: No cervical motion tenderness or lesion.      Uterus: Normal.       Adnexa: Right adnexa normal and left adnexa normal.        Right: No mass or tenderness.          Left: No mass or tenderness.     Neurological:      Mental Status: She is alert.            Assessment and Plan    Problem List Items Addressed This Visit          Genitourinary and Reproductive     Dysmenorrhea    Overview     More on right side, worsening over time. RTO for U/S         Relevant Orders    US Non-ob Transvaginal     Other Visit Diagnoses       History of UTI    -  Primary    Relevant Orders    POC Urinalysis Dipstick (Completed)    Women's annual routine gynecological examination        Encounter for screening mammogram for malignant neoplasm of breast        Relevant Orders    Mammo Screening Digital Tomosynthesis Bilateral With CAD          She has intermittent left breast pain that is premenstrual and resolves at onset of menses. No breast changes. Can try vitamin E 400 IU bid.   Recent worsening right sided dysmenorrhea that is concerning to her.  Had one recent UTI. Asymptomatic. CCUA negative today.     GYN annual well woman exam.   Pap guidelines reviewed.  Reviewed monthly self breast exams.  Instructed to call with lumps, pain, or breast discharge.    Ordered Mammogram today  Return in about 1 month (around 2/26/2024) for U/S.     Nakia Arriola, NALLELY  01/26/2024

## 2024-02-15 ENCOUNTER — OFFICE VISIT (OUTPATIENT)
Dept: OBSTETRICS AND GYNECOLOGY | Facility: CLINIC | Age: 44
End: 2024-02-15
Payer: COMMERCIAL

## 2024-02-15 VITALS
HEIGHT: 63 IN | BODY MASS INDEX: 21.09 KG/M2 | SYSTOLIC BLOOD PRESSURE: 112 MMHG | DIASTOLIC BLOOD PRESSURE: 72 MMHG | WEIGHT: 119 LBS

## 2024-02-15 DIAGNOSIS — N94.6 DYSMENORRHEA: ICD-10-CM

## 2024-02-15 DIAGNOSIS — N83.201 RIGHT OVARIAN CYST: Primary | ICD-10-CM

## 2024-02-15 PROCEDURE — 99213 OFFICE O/P EST LOW 20 MIN: CPT | Performed by: NURSE PRACTITIONER

## 2024-03-08 ENCOUNTER — TELEPHONE (OUTPATIENT)
Dept: OBSTETRICS AND GYNECOLOGY | Facility: CLINIC | Age: 44
End: 2024-03-08
Payer: COMMERCIAL

## 2024-03-08 NOTE — TELEPHONE ENCOUNTER
PT CALLING SHE NOTICED NEW RESULTS IN MYCHART ON HER ULTRASOUND THAT SHE HAD DONE ON 2/15   PT WOULD LIKE TO KNOW IF SHE NEEDS AN EARLIER FOLLOW UP

## 2024-03-08 NOTE — TELEPHONE ENCOUNTER
Last OV 2/15/24.     Patient was to follow up in 6 weeks but reports she saw the US report that said follow up in 6 months. Office note from [facility] says to follow up in 6 weeks. Appointment scheduled. Patient v/u.

## 2024-03-21 ENCOUNTER — TELEPHONE (OUTPATIENT)
Dept: OBSTETRICS AND GYNECOLOGY | Facility: CLINIC | Age: 44
End: 2024-03-21
Payer: COMMERCIAL

## 2024-03-21 NOTE — TELEPHONE ENCOUNTER
Patient called and states that she has a hemorrhoid that has flared up from straining with bowel movements and would like to know what she can do to help alleviate her symptoms. I recommended using OTC Preparation H or Tucks pads and to take a stool softener for up to two weeks or until her BM are soft and consistent. Pt v/u. She has a GYN appt next week on 03/28/2024.

## 2024-03-28 ENCOUNTER — OFFICE VISIT (OUTPATIENT)
Dept: OBSTETRICS AND GYNECOLOGY | Facility: CLINIC | Age: 44
End: 2024-03-28
Payer: COMMERCIAL

## 2024-03-28 VITALS
WEIGHT: 121 LBS | HEIGHT: 63 IN | SYSTOLIC BLOOD PRESSURE: 138 MMHG | DIASTOLIC BLOOD PRESSURE: 78 MMHG | BODY MASS INDEX: 21.44 KG/M2

## 2024-03-28 DIAGNOSIS — N83.201 RIGHT OVARIAN CYST: Primary | ICD-10-CM

## 2024-03-28 PROCEDURE — 99212 OFFICE O/P EST SF 10 MIN: CPT | Performed by: NURSE PRACTITIONER

## 2024-03-28 NOTE — PROGRESS NOTES
"      Chief Complaint   Patient presents with    Follow-up     Ovarian cyst          Subjective   HPI  Kimberly Booth is a 43 y.o. female, , who presents for follow up evaluation of right ovarian cyst.      Patient's last menstrual period was 03/10/2024 (exact date). She was last seen 6 week(s) ago. At that time the plan was expectant management for management of the cyst and to follow up for a recheck in 6 weeks. Since her last visit she denies pelvic pain. The patient reports additional symptoms as none.      Did the patient have u/s today? Yes    Additional OB/GYN History   Last Pap : 22- neg, HPV neg   Last Completed Pap Smear            PAP SMEAR (Every 3 Years) Next due on 2022  Pap IG, HPV-hr    10/16/2019  Done - negative, HPV negative                  History of abnormal Pap smear: no  Tobacco Usage?: No      Current Outpatient Medications:     multivitamin with minerals tablet tablet, Take 1 tablet by mouth Daily., Disp: , Rfl:      Past Medical History:   Diagnosis Date    Hyperlipidemia     PMS (premenstrual syndrome)     Varicella         Past Surgical History:   Procedure Laterality Date    TONSILLECTOMY      WISDOM TOOTH EXTRACTION         The additional following portions of the patient's history were reviewed and updated as appropriate: allergies and current medications.    Review of Systems   Constitutional: Negative.    Genitourinary: Negative.      All other systems reviewed and are negative.     I have reviewed and agree with the HPI, ROS, and historical information as entered above. Nakia Arriola, APRN      /78   Ht 160 cm (63\")   Wt 54.9 kg (121 lb)   LMP 03/10/2024 (Exact Date)   BMI 21.43 kg/m²     Physical Exam  Constitutional:       Appearance: Normal appearance.   Pulmonary:      Effort: Pulmonary effort is normal.   Neurological:      Mental Status: She is alert.         Assessment & Plan     Assessment and Plan    Problem List Items " Addressed This Visit          Genitourinary and Reproductive     Right ovarian cyst - Primary    Overview     23 x 23 x 25 mm complex. Repeat U/S 6 weeks, seemingly resolved, however there are 2 complex cysts #1-15 x 15 x 16 mm #2- 17 x 14 x 13 mm. Possible CL cysts? Repeat 6 months         Relevant Orders    US Non-ob Transvaginal     Previous cyst likely resolved, pain resolved, however there are 2 new cysts, likely CL but will follow in 6 months to be cautious.     Follow Up: Return in about 6 months (around 9/28/2024) for U/S.  Try OTC preparation H for hemorrhoid flare, call prn for Analpram.       Nakia Arriola, APRN  03/28/2024

## 2024-04-10 ENCOUNTER — HOSPITAL ENCOUNTER (OUTPATIENT)
Dept: MAMMOGRAPHY | Facility: HOSPITAL | Age: 44
Discharge: HOME | End: 2024-04-10
Admitting: NURSE PRACTITIONER
Payer: COMMERCIAL

## 2024-04-10 DIAGNOSIS — Z12.31 ENCOUNTER FOR SCREENING MAMMOGRAM FOR MALIGNANT NEOPLASM OF BREAST: ICD-10-CM

## 2024-04-10 PROCEDURE — 77063 BREAST TOMOSYNTHESIS BI: CPT

## 2024-04-10 PROCEDURE — 77067 SCR MAMMO BI INCL CAD: CPT

## 2024-09-26 ENCOUNTER — OFFICE VISIT (OUTPATIENT)
Dept: OBSTETRICS AND GYNECOLOGY | Facility: CLINIC | Age: 44
End: 2024-09-26
Payer: COMMERCIAL

## 2024-09-26 VITALS
WEIGHT: 126.6 LBS | SYSTOLIC BLOOD PRESSURE: 132 MMHG | DIASTOLIC BLOOD PRESSURE: 72 MMHG | BODY MASS INDEX: 22.43 KG/M2 | HEIGHT: 63 IN

## 2024-09-26 DIAGNOSIS — N83.201 RIGHT OVARIAN CYST: Primary | ICD-10-CM

## 2024-09-26 PROCEDURE — 99212 OFFICE O/P EST SF 10 MIN: CPT | Performed by: NURSE PRACTITIONER

## 2025-01-31 ENCOUNTER — OFFICE VISIT (OUTPATIENT)
Dept: OBSTETRICS AND GYNECOLOGY | Facility: CLINIC | Age: 45
End: 2025-01-31
Payer: COMMERCIAL

## 2025-01-31 VITALS
DIASTOLIC BLOOD PRESSURE: 78 MMHG | BODY MASS INDEX: 22.01 KG/M2 | SYSTOLIC BLOOD PRESSURE: 122 MMHG | WEIGHT: 124.2 LBS | HEIGHT: 63 IN

## 2025-01-31 DIAGNOSIS — Z12.31 ENCOUNTER FOR SCREENING MAMMOGRAM FOR MALIGNANT NEOPLASM OF BREAST: ICD-10-CM

## 2025-01-31 DIAGNOSIS — Z01.419 WOMEN'S ANNUAL ROUTINE GYNECOLOGICAL EXAMINATION: Primary | ICD-10-CM

## 2025-01-31 PROBLEM — N94.6 DYSMENORRHEA: Status: RESOLVED | Noted: 2024-01-26 | Resolved: 2025-01-31

## 2025-01-31 PROBLEM — N83.201 RIGHT OVARIAN CYST: Status: RESOLVED | Noted: 2024-02-15 | Resolved: 2025-01-31

## 2025-01-31 NOTE — PROGRESS NOTES
Gynecologic Annual Exam Note          GYN Annual Exam     Annual        Subjective     HPI  Kimberly Booth is a 44 y.o. female, , who presents for annual well woman exam as a established patient. There were no changes to her medical or surgical history since her last visit..  Patient's last menstrual period was 2025 (approximate).  Her periods occur every 25-35 days, lasting 4-5 days.  The flow is moderate to heavy. She reports dysmenorrhea is mild. Marital Status: . She is sexually active. She has not had new partners.. STD testing recommendations have been explained to the patient and she declines STD testing.    The patient would like to discuss the following complaints today: none    Additional OB/GYN History   contraceptive methods: Vasectomy   Desires to: do not start contraception  History of migraines: no    Last Pap : 2022. Result: negative. HPV: negative.   Last Completed Pap Smear       This patient has no relevant Health Maintenance data.          History of abnormal Pap smear: no  Family history of uterine, colon, breast, or ovarian cancer: no  Performs monthly Self-Breast Exam: yes  Last mammogram: 4/10/2024. Done at [facility]. There is a copy in the chart.    Last Completed Mammogram            Ordered - MAMMOGRAM (Every 2 Years) Ordered on 2025      04/10/2024  Mammo Screening Digital Tomosynthesis Bilateral With CAD    2023  Mammo Screening Digital Tomosynthesis Bilateral With CAD    02/15/2022  Mammo Screening Digital Tomosynthesis Bilateral With CAD    2021  Mammo Diagnostic Digital Tomosynthesis Bilateral With CAD                    Colonoscopy: has never had a colonoscopy or cologuard  Exercises Regularly: yes  Feelings of Anxiety or Depression: no  Tobacco Usage?: No       Current Outpatient Medications:     multivitamin with minerals tablet tablet, Take 1 tablet by mouth Daily., Disp: , Rfl:      Patient denies the need for medication refills  "today.    OB History          3    Para   3    Term   3       0    AB   0    Living   3         SAB   0    IAB   0    Ectopic   0    Molar        Multiple   0    Live Births   3                Past Medical History:   Diagnosis Date    Hyperlipidemia     Multiple gestation     Ovarian cyst     PMS (premenstrual syndrome)     Urinary tract infection     Varicella         Past Surgical History:   Procedure Laterality Date    TONSILLECTOMY      WISDOM TOOTH EXTRACTION         Health Maintenance   Topic Date Due    TDAP/TD VACCINES (1 - Tdap) Never done    ANNUAL PHYSICAL  Never done    LIPID PANEL  2023    INFLUENZA VACCINE  2024    COVID-19 Vaccine (3 -  season) 2024    PAP SMEAR  2025    Annual Gynecologic Pelvic and Breast Exam  2025    MAMMOGRAM  04/10/2026    HEPATITIS C SCREENING  Completed    Pneumococcal Vaccine 0-64  Aged Out       The additional following portions of the patient's history were reviewed and updated as appropriate: allergies, current medications, past family history, past medical history, past social history, past surgical history, and problem list.    Review of Systems   Constitutional: Negative.    Respiratory: Negative.     Cardiovascular: Negative.    Gastrointestinal: Negative.    Genitourinary: Negative.    Psychiatric/Behavioral:  The patient is nervous/anxious.          I have reviewed and agree with the HPI, ROS, and historical information as entered above. Nakia Arriola, APRN          Objective   /78   Ht 160 cm (62.99\")   Wt 56.3 kg (124 lb 3.2 oz)   LMP 2025 (Approximate)   BMI 22.01 kg/m²     Physical Exam  Constitutional:       Appearance: Normal appearance.   Neck:      Thyroid: No thyroid mass or thyromegaly.   Pulmonary:      Effort: Pulmonary effort is normal.   Chest:      Chest wall: No mass.   Breasts:     Right: Normal. No inverted nipple, mass, nipple discharge or skin change.      Left: Normal. No " inverted nipple, mass, nipple discharge or skin change.   Abdominal:      General: There is no distension.      Palpations: Abdomen is soft. There is no mass.      Tenderness: There is no abdominal tenderness.      Hernia: No hernia is present.   Genitourinary:     General: Normal vulva.      Labia:         Right: No rash.         Left: No rash.       Vagina: Normal.      Cervix: No cervical motion tenderness or lesion.      Uterus: Normal.       Adnexa: Right adnexa normal and left adnexa normal.        Right: No mass or tenderness.          Left: No mass or tenderness.     Neurological:      Mental Status: She is alert.            Assessment and Plan    Problem List Items Addressed This Visit    None  Visit Diagnoses       Women's annual routine gynecological examination    -  Primary    Relevant Orders    LIQUID-BASED PAP SMEAR WITH HPV GENOTYPING REGARDLESS OF INTERPRETATION ([facility])    Encounter for screening mammogram for malignant neoplasm of breast        Relevant Orders    Mammo Screening Digital Tomosynthesis Bilateral With CAD            GYN annual well woman exam.   Pap guidelines reviewed.  Reviewed monthly self breast exams.  Instructed to call with lumps, pain, or breast discharge.    Ordered Mammogram today  Return in about 1 year (around 1/31/2026) for Annual physical.     Nakia Arriola, APRN  01/31/2025

## 2025-02-04 LAB — REF LAB TEST METHOD: NORMAL

## 2025-03-30 LAB
NCCN CRITERIA FLAG: NORMAL
TYRER CUZICK SCORE: 8

## 2025-04-14 ENCOUNTER — HOSPITAL ENCOUNTER (OUTPATIENT)
Dept: MAMMOGRAPHY | Facility: HOSPITAL | Age: 45
Discharge: HOME | End: 2025-04-14
Admitting: NURSE PRACTITIONER
Payer: COMMERCIAL

## 2025-04-14 DIAGNOSIS — Z12.31 ENCOUNTER FOR SCREENING MAMMOGRAM FOR MALIGNANT NEOPLASM OF BREAST: ICD-10-CM

## 2025-04-14 PROCEDURE — 77067 SCR MAMMO BI INCL CAD: CPT

## 2025-04-14 PROCEDURE — 77063 BREAST TOMOSYNTHESIS BI: CPT

## 2025-04-21 ENCOUNTER — HOSPITAL ENCOUNTER (OUTPATIENT)
Dept: MAMMOGRAPHY | Facility: HOSPITAL | Age: 45
Discharge: HOME | End: 2025-04-21
Payer: COMMERCIAL

## 2025-04-21 ENCOUNTER — HOSPITAL ENCOUNTER (OUTPATIENT)
Dept: ULTRASOUND IMAGING | Facility: HOSPITAL | Age: 45
Discharge: HOME | End: 2025-04-21
Payer: COMMERCIAL

## 2025-04-21 ENCOUNTER — TELEPHONE (OUTPATIENT)
Dept: OBSTETRICS AND GYNECOLOGY | Facility: CLINIC | Age: 45
End: 2025-04-21
Payer: COMMERCIAL

## 2025-04-21 DIAGNOSIS — R92.8 ABNORMAL MAMMOGRAM: ICD-10-CM

## 2025-04-21 PROCEDURE — 76642 ULTRASOUND BREAST LIMITED: CPT | Performed by: RADIOLOGY

## 2025-04-21 PROCEDURE — G0279 TOMOSYNTHESIS, MAMMO: HCPCS

## 2025-04-21 PROCEDURE — 76642 ULTRASOUND BREAST LIMITED: CPT

## 2025-04-21 PROCEDURE — 77061 BREAST TOMOSYNTHESIS UNI: CPT | Performed by: RADIOLOGY

## 2025-04-21 PROCEDURE — 77065 DX MAMMO INCL CAD UNI: CPT | Performed by: RADIOLOGY

## 2025-04-21 PROCEDURE — 77065 DX MAMMO INCL CAD UNI: CPT

## 2025-04-21 NOTE — TELEPHONE ENCOUNTER
Patient called. She sees NALLELY Joseph. She reports over the weekend she received her mammogram results and they were abnormal. Advised patient according to her report it appears they are needing additional imaging of the right breast and they recommended a diagnostic mammogram and US if needed. Advised patient that I will have Nakia review her results and place orders if needed or sometimes they use the orders at the bottom of the report. Advised patient regardless someone will call her to schedule the diagnostic mammogram. Explained they will do her diagnostic mammogram and an US in the same day if needed and she will find out results that day as well. She ISAURO.        DANGELO Cooper